# Patient Record
Sex: MALE | Race: WHITE | NOT HISPANIC OR LATINO | Employment: FULL TIME | URBAN - METROPOLITAN AREA
[De-identification: names, ages, dates, MRNs, and addresses within clinical notes are randomized per-mention and may not be internally consistent; named-entity substitution may affect disease eponyms.]

---

## 2020-06-14 ENCOUNTER — HOSPITAL ENCOUNTER (EMERGENCY)
Facility: HOSPITAL | Age: 65
Discharge: HOME | End: 2020-06-14
Attending: EMERGENCY MEDICINE | Admitting: EMERGENCY MEDICINE

## 2020-06-14 ENCOUNTER — APPOINTMENT (OUTPATIENT)
Dept: GENERAL RADIOLOGY | Facility: HOSPITAL | Age: 65
End: 2020-06-14

## 2020-06-14 VITALS
WEIGHT: 237 LBS | HEART RATE: 63 BPM | RESPIRATION RATE: 16 BRPM | DIASTOLIC BLOOD PRESSURE: 81 MMHG | TEMPERATURE: 98.6 F | SYSTOLIC BLOOD PRESSURE: 143 MMHG | OXYGEN SATURATION: 100 % | BODY MASS INDEX: 29.47 KG/M2 | HEIGHT: 75 IN

## 2020-06-14 DIAGNOSIS — R04.2 HEMOPTYSIS: Primary | ICD-10-CM

## 2020-06-14 LAB
ALBUMIN SERPL-MCNC: 4.7 G/DL (ref 3.5–5.2)
ALBUMIN/GLOB SERPL: 1.8 G/DL
ALP SERPL-CCNC: 48 U/L (ref 39–117)
ALT SERPL W P-5'-P-CCNC: 15 U/L (ref 1–41)
ANION GAP SERPL CALCULATED.3IONS-SCNC: 12 MMOL/L (ref 5–15)
AST SERPL-CCNC: 18 U/L (ref 1–40)
BASOPHILS # BLD AUTO: 0.05 10*3/MM3 (ref 0–0.2)
BASOPHILS NFR BLD AUTO: 0.9 % (ref 0–1.5)
BILIRUB SERPL-MCNC: 0.8 MG/DL (ref 0.2–1.2)
BUN BLD-MCNC: 14 MG/DL (ref 8–23)
BUN/CREAT SERPL: 14.7 (ref 7–25)
CALCIUM SPEC-SCNC: 9.7 MG/DL (ref 8.6–10.5)
CHLORIDE SERPL-SCNC: 106 MMOL/L (ref 98–107)
CO2 SERPL-SCNC: 26 MMOL/L (ref 22–29)
CREAT BLD-MCNC: 0.95 MG/DL (ref 0.76–1.27)
D DIMER PPP FEU-MCNC: <0.27 MCGFEU/ML (ref 0–0.56)
DEPRECATED RDW RBC AUTO: 44.3 FL (ref 37–54)
EOSINOPHIL # BLD AUTO: 0.04 10*3/MM3 (ref 0–0.4)
EOSINOPHIL NFR BLD AUTO: 0.7 % (ref 0.3–6.2)
ERYTHROCYTE [DISTWIDTH] IN BLOOD BY AUTOMATED COUNT: 13.3 % (ref 12.3–15.4)
GFR SERPL CREATININE-BSD FRML MDRD: 80 ML/MIN/1.73
GLOBULIN UR ELPH-MCNC: 2.6 GM/DL
GLUCOSE BLD-MCNC: 91 MG/DL (ref 65–99)
HCT VFR BLD AUTO: 44.6 % (ref 37.5–51)
HGB BLD-MCNC: 15 G/DL (ref 13–17.7)
IMM GRANULOCYTES # BLD AUTO: 0.01 10*3/MM3 (ref 0–0.05)
IMM GRANULOCYTES NFR BLD AUTO: 0.2 % (ref 0–0.5)
INR PPP: 1.03 (ref 0.85–1.16)
LYMPHOCYTES # BLD AUTO: 1.47 10*3/MM3 (ref 0.7–3.1)
LYMPHOCYTES NFR BLD AUTO: 27.5 % (ref 19.6–45.3)
MCH RBC QN AUTO: 30.5 PG (ref 26.6–33)
MCHC RBC AUTO-ENTMCNC: 33.6 G/DL (ref 31.5–35.7)
MCV RBC AUTO: 90.7 FL (ref 79–97)
MONOCYTES # BLD AUTO: 0.5 10*3/MM3 (ref 0.1–0.9)
MONOCYTES NFR BLD AUTO: 9.3 % (ref 5–12)
NEUTROPHILS # BLD AUTO: 3.28 10*3/MM3 (ref 1.7–7)
NEUTROPHILS NFR BLD AUTO: 61.4 % (ref 42.7–76)
NRBC BLD AUTO-RTO: 0 /100 WBC (ref 0–0.2)
PLATELET # BLD AUTO: 187 10*3/MM3 (ref 140–450)
PMV BLD AUTO: 9.9 FL (ref 6–12)
POTASSIUM BLD-SCNC: 4.3 MMOL/L (ref 3.5–5.2)
PROT SERPL-MCNC: 7.3 G/DL (ref 6–8.5)
PROTHROMBIN TIME: 13.2 SECONDS (ref 11.5–14)
RBC # BLD AUTO: 4.92 10*6/MM3 (ref 4.14–5.8)
SODIUM BLD-SCNC: 144 MMOL/L (ref 136–145)
WBC NRBC COR # BLD: 5.35 10*3/MM3 (ref 3.4–10.8)

## 2020-06-14 PROCEDURE — 85025 COMPLETE CBC W/AUTO DIFF WBC: CPT | Performed by: PHYSICIAN ASSISTANT

## 2020-06-14 PROCEDURE — 99284 EMERGENCY DEPT VISIT MOD MDM: CPT

## 2020-06-14 PROCEDURE — 85610 PROTHROMBIN TIME: CPT | Performed by: PHYSICIAN ASSISTANT

## 2020-06-14 PROCEDURE — 80053 COMPREHEN METABOLIC PANEL: CPT | Performed by: PHYSICIAN ASSISTANT

## 2020-06-14 PROCEDURE — 71045 X-RAY EXAM CHEST 1 VIEW: CPT

## 2020-06-14 PROCEDURE — 85379 FIBRIN DEGRADATION QUANT: CPT | Performed by: PHYSICIAN ASSISTANT

## 2020-06-14 RX ORDER — SODIUM CHLORIDE 0.9 % (FLUSH) 0.9 %
10 SYRINGE (ML) INJECTION AS NEEDED
Status: DISCONTINUED | OUTPATIENT
Start: 2020-06-14 | End: 2020-06-15 | Stop reason: HOSPADM

## 2020-06-15 NOTE — DISCHARGE INSTRUCTIONS
Follow up with one of the [facility] Primary Care Providers below to setup primary care. If you need assistance coordinating a primary care appointment with a [facility] Primary Care Provider, please contact the Primary Care Coordinators at [phone] for appointment scheduling.    [facility], Primary Care   [address] , [address]   [place], [place] [address] [phone]    [facility] Internal Medicine & Endocrinology  [address], [address]  [place] [address] [phone]    [facility] Family Medicine  [address], [address]   [place] [address] [phone]    [facility] Primary Care  [address], [address]  [place], [place] [address] [phone]    [facility], Primary Care,   [address], [address]   [place], [place] [address] [phone]    [facility] Primary Care  [address]  [place], [place] [address] [phone]    [facility], Primary Care  [address], [address]  [place], [place] [address] [phone]    [facility] Internal Medicine & Pediatrics  [address], [address]   [place] [address] [phone]    [facility], Primary Care  [address] [address] [phone]      [facility] Primary Care  [address], [address]   [place] [address] [phone]    [facility] Family Medicine  [address] [place] [address] [phone]

## 2020-06-15 NOTE — ED PROVIDER NOTES
EMERGENCY DEPARTMENT ENCOUNTER    Room Number:  02/02  Date of encounter:  6/16/2020  PCP: Provider, No Known  Historian: Patient      HPI:  Chief Complaint: Coughing up blood x1 day    A complete HPI/ROS/PMH/PSH/SH/FH are unobtainable due to: N/A    Context: Gigi Cooney is a 65 y.o. male who presents to the ED c/o hemoptysis that happened twice today.  He states he has had no fevers no chills has had a little bit of a cough that was hard last night and did use some cleaning spray which he states he inhaled a little bit of.  He states that he had no COVID exposure that he is aware of.  He has had no chest pain associated with this no dyspnea.  He denies pedal edema.  He has no known cardiac disease, lung disease including COPD or emphysema.  No prior history of DVT or pulmonary emboli.  Risk factors for PE are minimal he does not smoke he is in no prior history of cancer he is no other comorbidities.  He has traveled recently to [place] and back.  He was seen at the [facility] and sent here to be evaluated.      PAST MEDICAL HISTORY  Active Ambulatory Problems     Diagnosis Date Noted   • No Active Ambulatory Problems     Resolved Ambulatory Problems     Diagnosis Date Noted   • No Resolved Ambulatory Problems     No Additional Past Medical History         PAST SURGICAL HISTORY  History reviewed. No pertinent surgical history.      FAMILY HISTORY  History reviewed. No pertinent family history.      SOCIAL HISTORY  Social History     Socioeconomic History   • Marital status: Single     Spouse name: Not on file   • Number of children: Not on file   • Years of education: Not on file   • Highest education level: Not on file   Tobacco Use   • Smoking status: Never Smoker   Substance and Sexual Activity   • Alcohol use: Not Currently         ALLERGIES  Rocephin [ceftriaxone]        REVIEW OF SYSTEMS  Review of Systems     All systems reviewed and negative except for those discussed in HPI.       PHYSICAL  EXAM    I have reviewed the triage vital signs and nursing notes.    ED Triage Vitals   Temp Heart Rate Resp BP SpO2   06/14/20 1705 06/14/20 1708 06/14/20 1708 06/14/20 1708 06/14/20 1708   98.6 °F (37 °C) 60 16 170/95 97 %      Temp src Heart Rate Source Patient Position BP Location FiO2 (%)   -- -- -- -- --              Physical Exam  GENERAL: Warm pink dry afebrile nontoxic well developed.  Appears in no acute distress.  HENT: Nares patent  EYES: No scleral icterus  CV: Regular rhythm, regular rate  RESPIRATORY: Normal effort.  No audible wheezes, rales or rhonchi  ABDOMEN: Soft, nontender  MUSCULOSKELETAL: No deformities.   NEURO: Alert, moves all extremities, follows commands  SKIN: Warm, dry, no rash visualized        LAB RESULTS  No results found for this or any previous visit (from the past 24 hour(s)).    Ordered the above labs and independently reviewed the results.        RADIOLOGY  No Radiology Exams Resulted Within Past 24 Hours    I ordered and reviewed the above noted radiographic studies.    I viewed images of chest x-ray which showed no acute disease per my independent interpretation.  See radiologist's dictation for official interpretation.        PROCEDURES    Procedures      MEDICATIONS GIVEN IN ER    Medications - No data to display      PROGRESS, DATA ANALYSIS, CONSULTS, AND MEDICAL DECISION MAKING    All labs have been independently reviewed by me.  All radiology studies have been reviewed by me and the radiologist dictating the report.   EKG's have been independently viewed and interpreted by me.      Differential diagnoses: Bronchitis, COVID-19, pulmonary emboli           Discussed the findings with the patient.  We will follow-up with his primary care physician.  Discussed possibilities of this being from posterior nasal drainage and his congestion allergies.      AS OF 12:23 VITALS:    BP - 143/81  HR - 63  TEMP - 98.6 °F (37 °C)  O2 SATS - 100%        DIAGNOSIS  Final diagnoses:    Hemoptysis         DISPOSITION  DISCHARGE    Patient discharged in stable condition.    Reviewed implications of results, diagnosis, meds, responsibility to follow up, warning signs and symptoms of possible worsening, potential complications and reasons to return to ER.    Patient/Family voiced understanding of above instructions.    Discussed plan for discharge, as there is no emergent indication for admission.  Pt/family is agreeable and understands need for follow up and possible repeat testing.  Pt/family is aware that discharge does not mean that nothing is wrong but that it indicates no emergency is currently present that requires admission and they must continue care with follow-up as given below or with a physician of their choice.     FOLLOW-UP  [facility] Emergency Department  [address] [address] [phone]    As needed         Medication List      No changes were made to your prescriptions during this visit.                  Alen Parker PA  06/16/20 6007

## 2021-02-02 ENCOUNTER — APPOINTMENT (OUTPATIENT)
Dept: GENERAL RADIOLOGY | Facility: HOSPITAL | Age: 66
End: 2021-02-02

## 2021-02-02 ENCOUNTER — HOSPITAL ENCOUNTER (EMERGENCY)
Facility: HOSPITAL | Age: 66
Discharge: HOME | End: 2021-02-02
Attending: EMERGENCY MEDICINE | Admitting: EMERGENCY MEDICINE

## 2021-02-02 VITALS
HEART RATE: 58 BPM | RESPIRATION RATE: 18 BRPM | OXYGEN SATURATION: 97 % | BODY MASS INDEX: 29.84 KG/M2 | WEIGHT: 240 LBS | HEIGHT: 75 IN | SYSTOLIC BLOOD PRESSURE: 156 MMHG | TEMPERATURE: 98 F | DIASTOLIC BLOOD PRESSURE: 83 MMHG

## 2021-02-02 DIAGNOSIS — M54.2 NECK DISCOMFORT: ICD-10-CM

## 2021-02-02 DIAGNOSIS — R07.89 CHEST TIGHTNESS: Primary | ICD-10-CM

## 2021-02-02 LAB
ALBUMIN SERPL-MCNC: 4.4 G/DL (ref 3.5–5.2)
ALBUMIN/GLOB SERPL: 1.6 G/DL
ALP SERPL-CCNC: 57 U/L (ref 39–117)
ALT SERPL W P-5'-P-CCNC: 11 U/L (ref 1–41)
ANION GAP SERPL CALCULATED.3IONS-SCNC: 12 MMOL/L (ref 5–15)
AST SERPL-CCNC: 11 U/L (ref 1–40)
BASOPHILS # BLD AUTO: 0 10*3/MM3 (ref 0–0.2)
BASOPHILS NFR BLD AUTO: 0 % (ref 0–1.5)
BILIRUB SERPL-MCNC: 0.6 MG/DL (ref 0–1.2)
BUN SERPL-MCNC: 19 MG/DL (ref 8–23)
BUN/CREAT SERPL: 24.4 (ref 7–25)
CALCIUM SPEC-SCNC: 9.3 MG/DL (ref 8.6–10.5)
CHLORIDE SERPL-SCNC: 103 MMOL/L (ref 98–107)
CO2 SERPL-SCNC: 24 MMOL/L (ref 22–29)
CREAT SERPL-MCNC: 0.78 MG/DL (ref 0.76–1.27)
DEPRECATED RDW RBC AUTO: 44.9 FL (ref 37–54)
EOSINOPHIL # BLD AUTO: 0 10*3/MM3 (ref 0–0.4)
EOSINOPHIL NFR BLD AUTO: 0 % (ref 0.3–6.2)
ERYTHROCYTE [DISTWIDTH] IN BLOOD BY AUTOMATED COUNT: 13.6 % (ref 12.3–15.4)
GFR SERPL CREATININE-BSD FRML MDRD: 100 ML/MIN/1.73
GLOBULIN UR ELPH-MCNC: 2.7 GM/DL
GLUCOSE SERPL-MCNC: 120 MG/DL (ref 65–99)
HCT VFR BLD AUTO: 44.2 % (ref 37.5–51)
HGB BLD-MCNC: 15.2 G/DL (ref 13–17.7)
HOLD SPECIMEN: NORMAL
IMM GRANULOCYTES # BLD AUTO: 0.04 10*3/MM3 (ref 0–0.05)
IMM GRANULOCYTES NFR BLD AUTO: 0.4 % (ref 0–0.5)
LIPASE SERPL-CCNC: 21 U/L (ref 13–60)
LYMPHOCYTES # BLD AUTO: 1.2 10*3/MM3 (ref 0.7–3.1)
LYMPHOCYTES NFR BLD AUTO: 13.5 % (ref 19.6–45.3)
MCH RBC QN AUTO: 30.9 PG (ref 26.6–33)
MCHC RBC AUTO-ENTMCNC: 34.4 G/DL (ref 31.5–35.7)
MCV RBC AUTO: 89.8 FL (ref 79–97)
MONOCYTES # BLD AUTO: 0.36 10*3/MM3 (ref 0.1–0.9)
MONOCYTES NFR BLD AUTO: 4 % (ref 5–12)
NEUTROPHILS NFR BLD AUTO: 7.3 10*3/MM3 (ref 1.7–7)
NEUTROPHILS NFR BLD AUTO: 82.1 % (ref 42.7–76)
NRBC BLD AUTO-RTO: 0 /100 WBC (ref 0–0.2)
NT-PROBNP SERPL-MCNC: 93.7 PG/ML (ref 0–900)
PLATELET # BLD AUTO: 218 10*3/MM3 (ref 140–450)
PMV BLD AUTO: 9.6 FL (ref 6–12)
POTASSIUM SERPL-SCNC: 4 MMOL/L (ref 3.5–5.2)
PROT SERPL-MCNC: 7.1 G/DL (ref 6–8.5)
QT INTERVAL: 398 MS
QTC INTERVAL: 423 MS
RBC # BLD AUTO: 4.92 10*6/MM3 (ref 4.14–5.8)
SODIUM SERPL-SCNC: 139 MMOL/L (ref 136–145)
TROPONIN T SERPL-MCNC: <0.01 NG/ML (ref 0–0.03)
TROPONIN T SERPL-MCNC: <0.01 NG/ML (ref 0–0.03)
WBC # BLD AUTO: 8.9 10*3/MM3 (ref 3.4–10.8)
WHOLE BLOOD HOLD SPECIMEN: NORMAL
WHOLE BLOOD HOLD SPECIMEN: NORMAL

## 2021-02-02 PROCEDURE — 99284 EMERGENCY DEPT VISIT MOD MDM: CPT

## 2021-02-02 PROCEDURE — 93005 ELECTROCARDIOGRAM TRACING: CPT | Performed by: EMERGENCY MEDICINE

## 2021-02-02 PROCEDURE — 71045 X-RAY EXAM CHEST 1 VIEW: CPT

## 2021-02-02 PROCEDURE — U0004 COV-19 TEST NON-CDC HGH THRU: HCPCS | Performed by: EMERGENCY MEDICINE

## 2021-02-02 PROCEDURE — 85025 COMPLETE CBC W/AUTO DIFF WBC: CPT | Performed by: EMERGENCY MEDICINE

## 2021-02-02 PROCEDURE — 83880 ASSAY OF NATRIURETIC PEPTIDE: CPT | Performed by: EMERGENCY MEDICINE

## 2021-02-02 PROCEDURE — C9803 HOPD COVID-19 SPEC COLLECT: HCPCS | Performed by: EMERGENCY MEDICINE

## 2021-02-02 PROCEDURE — 83690 ASSAY OF LIPASE: CPT | Performed by: EMERGENCY MEDICINE

## 2021-02-02 PROCEDURE — 84484 ASSAY OF TROPONIN QUANT: CPT | Performed by: EMERGENCY MEDICINE

## 2021-02-02 PROCEDURE — 80053 COMPREHEN METABOLIC PANEL: CPT | Performed by: EMERGENCY MEDICINE

## 2021-02-02 RX ORDER — SODIUM CHLORIDE 0.9 % (FLUSH) 0.9 %
10 SYRINGE (ML) INJECTION AS NEEDED
Status: DISCONTINUED | OUTPATIENT
Start: 2021-02-02 | End: 2021-02-02 | Stop reason: HOSPADM

## 2021-02-02 RX ORDER — ASPIRIN 81 MG/1
324 TABLET, CHEWABLE ORAL ONCE
Status: COMPLETED | OUTPATIENT
Start: 2021-02-02 | End: 2021-02-02

## 2021-02-02 RX ADMIN — ASPIRIN 324 MG: 81 TABLET, CHEWABLE ORAL at 14:04

## 2021-02-02 NOTE — ED PROVIDER NOTES
EMERGENCY DEPARTMENT ENCOUNTER    Room Number:  31/31  Date of encounter:  2/2/2021  PCP: Provider, No Known  Historian: Patient      HPI:  Chief Complaint: Tightness in neck, chest, abdomen.        Context: Gigi Cooney is a 65 y.o. male who presents to the ED c/o what he feels to be an allergic reaction.  The patient received a course of Zithromax starting on January 21 or 22 and, bleeding on the 24th when he felt symptoms as above.  He presented to [facility] emergency department on 125 and was given Benadryl, Pepcid, Solu-Medrol on the 26.  He took a Medrol Dosepak and seemed to improve.  Patient felt his symptoms returning and went back to [facility] emergency department yesterday.  Again he was given Solu-Medrol and Pepcid.  He had no rash but had the sensation of constriction in his neck chest and upper abdomen.  They worked him up with normal chest x-ray troponin and other labs.  He is now on another Medrol Dosepak.  He talked with a friend who suggested he come to our facility for further evaluation as he may still be suffering from allergic reaction.      PAST MEDICAL HISTORY  Active Ambulatory Problems     Diagnosis Date Noted   • No Active Ambulatory Problems     Resolved Ambulatory Problems     Diagnosis Date Noted   • No Resolved Ambulatory Problems     Past Medical History:   Diagnosis Date   • Allergies          PAST SURGICAL HISTORY  Past Surgical History:   Procedure Laterality Date   • TONSILLECTOMY           FAMILY HISTORY  History reviewed. No pertinent family history.      SOCIAL HISTORY  Social History     Socioeconomic History   • Marital status: Single     Spouse name: Not on file   • Number of children: Not on file   • Years of education: Not on file   • Highest education level: Not on file   Tobacco Use   • Smoking status: Never Smoker   Substance and Sexual Activity   • Alcohol use: Not Currently         ALLERGIES  Rocephin [ceftriaxone] and Azithromycin        REVIEW OF  SYSTEMS  Review of Systems     All systems reviewed and negative except for those discussed in HPI.       PHYSICAL EXAM    I have reviewed the triage vital signs and nursing notes.    ED Triage Vitals [02/02/21 1305]   Temp Heart Rate Resp BP SpO2   98 °F (36.7 °C) 75 16 173/79 99 %      Temp src Heart Rate Source Patient Position BP Location FiO2 (%)   -- Monitor Sitting Left arm --       Physical Exam  GENERAL:   Appears somewhat anxious but nontoxic.  HENT: Nares patent.  Clear oropharynx without any signs of edema  EYES: No scleral icterus.  CV: Regular rhythm, regular rate.  No murmurs gallops rubs  RESPIRATORY: Normal effort.  No audible wheezes, rales or rhonchi.  Completely clear to auscultation even with forced expiration there are no wheezes.  ABDOMEN: Soft, nontender.  MUSCULOSKELETAL: No deformities.   NEURO: Alert, moves all extremities, follows commands.  SKIN: Warm, dry, no rash visualized.        LAB RESULTS  Recent Results (from the past 24 hour(s))   ECG 12 Lead    Collection Time: 02/02/21  1:42 PM   Result Value Ref Range    QT Interval 398 ms    QTC Interval 423 ms   Troponin    Collection Time: 02/02/21  1:57 PM    Specimen: Blood   Result Value Ref Range    Troponin T <0.010 0.000 - 0.030 ng/mL   Comprehensive Metabolic Panel    Collection Time: 02/02/21  1:57 PM    Specimen: Blood   Result Value Ref Range    Glucose 120 (H) 65 - 99 mg/dL    BUN 19 8 - 23 mg/dL    Creatinine 0.78 0.76 - 1.27 mg/dL    Sodium 139 136 - 145 mmol/L    Potassium 4.0 3.5 - 5.2 mmol/L    Chloride 103 98 - 107 mmol/L    CO2 24.0 22.0 - 29.0 mmol/L    Calcium 9.3 8.6 - 10.5 mg/dL    Total Protein 7.1 6.0 - 8.5 g/dL    Albumin 4.40 3.50 - 5.20 g/dL    ALT (SGPT) 11 1 - 41 U/L    AST (SGOT) 11 1 - 40 U/L    Alkaline Phosphatase 57 39 - 117 U/L    Total Bilirubin 0.6 0.0 - 1.2 mg/dL    eGFR Non African Amer 100 >60 mL/min/1.73    Globulin 2.7 gm/dL    A/G Ratio 1.6 g/dL    BUN/Creatinine Ratio 24.4 7.0 - 25.0    Anion Gap  12.0 5.0 - 15.0 mmol/L   Lipase    Collection Time: 02/02/21  1:57 PM    Specimen: Blood   Result Value Ref Range    Lipase 21 13 - 60 U/L   BNP    Collection Time: 02/02/21  1:57 PM    Specimen: Blood   Result Value Ref Range    proBNP 93.7 0.0 - 900.0 pg/mL   Light Blue Top    Collection Time: 02/02/21  1:57 PM   Result Value Ref Range    Extra Tube hold for add-on    Green Top (Gel)    Collection Time: 02/02/21  1:57 PM   Result Value Ref Range    Extra Tube Hold for add-ons.    Lavender Top    Collection Time: 02/02/21  1:57 PM   Result Value Ref Range    Extra Tube hold for add-on    Gold Top - SST    Collection Time: 02/02/21  1:57 PM   Result Value Ref Range    Extra Tube Hold for add-ons.    Gray Top - Ice    Collection Time: 02/02/21  1:57 PM   Result Value Ref Range    Extra Tube Hold for add-ons.    CBC Auto Differential    Collection Time: 02/02/21  1:57 PM    Specimen: Blood   Result Value Ref Range    WBC 8.90 3.40 - 10.80 10*3/mm3    RBC 4.92 4.14 - 5.80 10*6/mm3    Hemoglobin 15.2 13.0 - 17.7 g/dL    Hematocrit 44.2 37.5 - 51.0 %    MCV 89.8 79.0 - 97.0 fL    MCH 30.9 26.6 - 33.0 pg    MCHC 34.4 31.5 - 35.7 g/dL    RDW 13.6 12.3 - 15.4 %    RDW-SD 44.9 37.0 - 54.0 fl    MPV 9.6 6.0 - 12.0 fL    Platelets 218 140 - 450 10*3/mm3    Neutrophil % 82.1 (H) 42.7 - 76.0 %    Lymphocyte % 13.5 (L) 19.6 - 45.3 %    Monocyte % 4.0 (L) 5.0 - 12.0 %    Eosinophil % 0.0 (L) 0.3 - 6.2 %    Basophil % 0.0 0.0 - 1.5 %    Immature Grans % 0.4 0.0 - 0.5 %    Neutrophils, Absolute 7.30 (H) 1.70 - 7.00 10*3/mm3    Lymphocytes, Absolute 1.20 0.70 - 3.10 10*3/mm3    Monocytes, Absolute 0.36 0.10 - 0.90 10*3/mm3    Eosinophils, Absolute 0.00 0.00 - 0.40 10*3/mm3    Basophils, Absolute 0.00 0.00 - 0.20 10*3/mm3    Immature Grans, Absolute 0.04 0.00 - 0.05 10*3/mm3    nRBC 0.0 0.0 - 0.2 /100 WBC   Troponin    Collection Time: 02/02/21  3:54 PM    Specimen: Blood   Result Value Ref Range    Troponin T <0.010 0.000 - 0.030  ng/mL       If labs were ordered, I independently reviewed the results.        RADIOLOGY  Xr Chest 1 View    Result Date: 2/2/2021  EXAMINATION: XR CHEST 1 VW- 02/02/2021  INDICATION: Chest Pain triage protocol  COMPARISON: 06/14/2020  FINDINGS: Heart, mediastinum and pulmonary vasculature appear within normal limits. Lungs appear well-expanded and clear except for mild peribronchial thickening and patchy interstitial changes, a little greater than on the prior study, perhaps low-level changes of bronchitis. No lung consolidation, effusion or pneumothorax is seen.      Mildly increased pulmonary interstitial markings bilaterally, perhaps mild bronchitis. No focal pneumonia or other new chest disease is seen.   D:  02/02/2021 E:  02/02/2021        I ordered and reviewed the above noted radiographic studies.    I viewed images of chest x-ray which showed no infiltrates per my independent interpretation.  See radiologist's dictation for official interpretation.        PROCEDURES    Procedures    ECG 12 Lead         ECG 12 Lead   Final Result   Test Reason : chest pain   Blood Pressure : **/** mmHG   Vent. Rate : 068 BPM     Atrial Rate : 068 BPM      P-R Int : 186 ms          QRS Dur : 106 ms       QT Int : 398 ms       P-R-T Axes : 061 062 030 degrees      QTc Int : 423 ms      Normal sinus rhythm with sinus arrhythmia   Normal ECG   No previous ECGs available   Confirmed by SARAH LOONEY MD ([id number]) on 2/2/2021 7:09:23 PM      Referred By:  EDMD           Confirmed By:SARAH LOONEY MD          MEDICATIONS GIVEN IN ER    Medications   sodium chloride 0.9 % flush 10 mL (has no administration in time range)   aspirin chewable tablet 324 mg (324 mg Oral Given 2/2/21 1404)         PROGRESS, DATA ANALYSIS, CONSULTS, AND MEDICAL DECISION MAKING    All labs have been independently reviewed by me.  All radiology studies have been reviewed by me and the radiologist dictating the report.   EKG's have been independently viewed and  interpreted by me.          ED Course as of Feb 02 1916   Tue Feb 02, 2021 1910 HEART Pathway for Early Discharge in Acute Chest Pain - MDCalc  Calculated on Feb 02 2021 7:09 PM  3 points -> HEART Pathway Score  Low risk -> 0.9-1.7% 30-day MACE Repeat troponin at 3 hours and if negative, discharge home with outpatient follow-up.    [MS]   1910 I spoke with the patient in detail about discharge instructions.  He understands the need to return immediately if worse.  On recheck after at least 5 hours of being in the emergency department he still has no oropharyngeal edema, wheezes, dermal changes.  He does admit that stress could be causing some of his symptoms.  I will still send him to the heart valve clinic for further cardiac testing.  I will have him continue his Medrol Dosepak.    [MS]      ED Course User Index  [MS] Clay Mccarty MD             AS OF 19:16 EST VITALS:    BP - 151/78  HR - 63  TEMP - 98 °F (36.7 °C)  O2 SATS - 98%        DIAGNOSIS  Final diagnoses:   Chest tightness   Neck discomfort         DISPOSITION  DISCHARGE    FOLLOW-UP  [facility] Emergency Department  [address] [address] [phone]    IF YOU HAVE ANY CONCERN OF WORSENING CONDITION         Medication List      No changes were made to your prescriptions during this visit.                  Clay Mccarty MD  02/02/21 1916

## 2021-02-03 LAB — SARS-COV-2 RNA RESP QL NAA+PROBE: NOT DETECTED

## 2021-02-03 NOTE — DISCHARGE INSTRUCTIONS
Continue your medications as previously prescribed.    Follow-up with your primary care provider next available.    Follow-up with a heart and valve clinic as I have ordered.  They should be calling you tomorrow to set up an outpatient appointment.    Return to the emergency department if you have any concerns of worsening condition.

## 2021-02-04 LAB
QT INTERVAL: 424 MS
QTC INTERVAL: 430 MS

## 2021-02-07 ENCOUNTER — HOSPITAL ENCOUNTER (OUTPATIENT)
Facility: HOSPITAL | Age: 66
Setting detail: OBSERVATION
Discharge: HOME | End: 2021-02-08
Attending: EMERGENCY MEDICINE | Admitting: HOSPITALIST

## 2021-02-07 ENCOUNTER — APPOINTMENT (OUTPATIENT)
Dept: CT IMAGING | Facility: HOSPITAL | Age: 66
End: 2021-02-07

## 2021-02-07 DIAGNOSIS — R07.9 CHEST PAIN, UNSPECIFIED TYPE: Primary | ICD-10-CM

## 2021-02-07 DIAGNOSIS — R13.14 PHARYNGOESOPHAGEAL DYSPHAGIA: ICD-10-CM

## 2021-02-07 PROBLEM — R93.3 ABNORMAL COMPUTED TOMOGRAPHY OF ESOPHAGUS: Status: ACTIVE | Noted: 2021-02-07

## 2021-02-07 PROBLEM — R03.0 ELEVATED BLOOD-PRESSURE READING WITHOUT DIAGNOSIS OF HYPERTENSION: Status: ACTIVE | Noted: 2021-02-07

## 2021-02-07 LAB
ALBUMIN SERPL-MCNC: 4.8 G/DL (ref 3.5–5.2)
ALBUMIN/GLOB SERPL: 1.7 G/DL
ALP SERPL-CCNC: 65 U/L (ref 39–117)
ALT SERPL W P-5'-P-CCNC: 12 U/L (ref 1–41)
ANION GAP SERPL CALCULATED.3IONS-SCNC: 12 MMOL/L (ref 5–15)
AST SERPL-CCNC: 13 U/L (ref 1–40)
BASOPHILS # BLD AUTO: 0.05 10*3/MM3 (ref 0–0.2)
BASOPHILS NFR BLD AUTO: 0.4 % (ref 0–1.5)
BILIRUB SERPL-MCNC: 0.9 MG/DL (ref 0–1.2)
BUN SERPL-MCNC: 24 MG/DL (ref 8–23)
BUN/CREAT SERPL: 21.6 (ref 7–25)
CALCIUM SPEC-SCNC: 10.1 MG/DL (ref 8.6–10.5)
CHLORIDE SERPL-SCNC: 98 MMOL/L (ref 98–107)
CO2 SERPL-SCNC: 27 MMOL/L (ref 22–29)
CREAT BLDA-MCNC: 1.1 MG/DL (ref 0.6–1.3)
CREAT SERPL-MCNC: 1.11 MG/DL (ref 0.76–1.27)
DEPRECATED RDW RBC AUTO: 45.9 FL (ref 37–54)
EOSINOPHIL # BLD AUTO: 0.07 10*3/MM3 (ref 0–0.4)
EOSINOPHIL NFR BLD AUTO: 0.6 % (ref 0.3–6.2)
ERYTHROCYTE [DISTWIDTH] IN BLOOD BY AUTOMATED COUNT: 13.8 % (ref 12.3–15.4)
FLUAV RNA RESP QL NAA+PROBE: NOT DETECTED
FLUBV RNA RESP QL NAA+PROBE: NOT DETECTED
GFR SERPL CREATININE-BSD FRML MDRD: 66 ML/MIN/1.73
GLOBULIN UR ELPH-MCNC: 2.9 GM/DL
GLUCOSE SERPL-MCNC: 98 MG/DL (ref 65–99)
HCT VFR BLD AUTO: 47.7 % (ref 37.5–51)
HGB BLD-MCNC: 16.2 G/DL (ref 13–17.7)
HOLD SPECIMEN: NORMAL
IMM GRANULOCYTES # BLD AUTO: 0.04 10*3/MM3 (ref 0–0.05)
IMM GRANULOCYTES NFR BLD AUTO: 0.3 % (ref 0–0.5)
LIPASE SERPL-CCNC: 32 U/L (ref 13–60)
LYMPHOCYTES # BLD AUTO: 2.54 10*3/MM3 (ref 0.7–3.1)
LYMPHOCYTES NFR BLD AUTO: 20.3 % (ref 19.6–45.3)
MCH RBC QN AUTO: 30.6 PG (ref 26.6–33)
MCHC RBC AUTO-ENTMCNC: 34 G/DL (ref 31.5–35.7)
MCV RBC AUTO: 90.2 FL (ref 79–97)
MONOCYTES # BLD AUTO: 0.96 10*3/MM3 (ref 0.1–0.9)
MONOCYTES NFR BLD AUTO: 7.7 % (ref 5–12)
NEUTROPHILS NFR BLD AUTO: 70.7 % (ref 42.7–76)
NEUTROPHILS NFR BLD AUTO: 8.85 10*3/MM3 (ref 1.7–7)
NRBC BLD AUTO-RTO: 0 /100 WBC (ref 0–0.2)
NT-PROBNP SERPL-MCNC: 42.4 PG/ML (ref 0–900)
PLATELET # BLD AUTO: 232 10*3/MM3 (ref 140–450)
PMV BLD AUTO: 9.5 FL (ref 6–12)
POTASSIUM SERPL-SCNC: 4.2 MMOL/L (ref 3.5–5.2)
PROT SERPL-MCNC: 7.7 G/DL (ref 6–8.5)
QT INTERVAL: 402 MS
QT INTERVAL: 428 MS
QTC INTERVAL: 394 MS
QTC INTERVAL: 409 MS
RBC # BLD AUTO: 5.29 10*6/MM3 (ref 4.14–5.8)
SARS-COV-2 RNA RESP QL NAA+PROBE: NOT DETECTED
SODIUM SERPL-SCNC: 137 MMOL/L (ref 136–145)
TROPONIN T SERPL-MCNC: <0.01 NG/ML (ref 0–0.03)
WBC # BLD AUTO: 12.51 10*3/MM3 (ref 3.4–10.8)
WHOLE BLOOD HOLD SPECIMEN: NORMAL
WHOLE BLOOD HOLD SPECIMEN: NORMAL

## 2021-02-07 PROCEDURE — 96372 THER/PROPH/DIAG INJ SC/IM: CPT

## 2021-02-07 PROCEDURE — 83690 ASSAY OF LIPASE: CPT | Performed by: EMERGENCY MEDICINE

## 2021-02-07 PROCEDURE — 84484 ASSAY OF TROPONIN QUANT: CPT | Performed by: EMERGENCY MEDICINE

## 2021-02-07 PROCEDURE — 99204 OFFICE O/P NEW MOD 45 MIN: CPT | Performed by: INTERNAL MEDICINE

## 2021-02-07 PROCEDURE — 99220 PR INITIAL OBSERVATION CARE/DAY 70 MINUTES: CPT | Performed by: PHYSICIAN ASSISTANT

## 2021-02-07 PROCEDURE — 83880 ASSAY OF NATRIURETIC PEPTIDE: CPT | Performed by: EMERGENCY MEDICINE

## 2021-02-07 PROCEDURE — G0378 HOSPITAL OBSERVATION PER HR: HCPCS

## 2021-02-07 PROCEDURE — 87636 SARSCOV2 & INF A&B AMP PRB: CPT | Performed by: INTERNAL MEDICINE

## 2021-02-07 PROCEDURE — 92610 EVALUATE SWALLOWING FUNCTION: CPT

## 2021-02-07 PROCEDURE — C9803 HOPD COVID-19 SPEC COLLECT: HCPCS

## 2021-02-07 PROCEDURE — 82565 ASSAY OF CREATININE: CPT

## 2021-02-07 PROCEDURE — 99284 EMERGENCY DEPT VISIT MOD MDM: CPT

## 2021-02-07 PROCEDURE — 25010000002 ENOXAPARIN PER 10 MG: Performed by: PHYSICIAN ASSISTANT

## 2021-02-07 PROCEDURE — 80053 COMPREHEN METABOLIC PANEL: CPT | Performed by: EMERGENCY MEDICINE

## 2021-02-07 PROCEDURE — 0 IOPAMIDOL PER 1 ML: Performed by: EMERGENCY MEDICINE

## 2021-02-07 PROCEDURE — 71275 CT ANGIOGRAPHY CHEST: CPT

## 2021-02-07 PROCEDURE — 85025 COMPLETE CBC W/AUTO DIFF WBC: CPT | Performed by: EMERGENCY MEDICINE

## 2021-02-07 PROCEDURE — 74174 CTA ABD&PLVS W/CONTRAST: CPT

## 2021-02-07 PROCEDURE — 93005 ELECTROCARDIOGRAM TRACING: CPT | Performed by: EMERGENCY MEDICINE

## 2021-02-07 PROCEDURE — 84484 ASSAY OF TROPONIN QUANT: CPT | Performed by: HOSPITALIST

## 2021-02-07 RX ORDER — ONDANSETRON 2 MG/ML
4 INJECTION INTRAMUSCULAR; INTRAVENOUS EVERY 6 HOURS PRN
Status: DISCONTINUED | OUTPATIENT
Start: 2021-02-07 | End: 2021-02-08 | Stop reason: HOSPADM

## 2021-02-07 RX ORDER — ACETAMINOPHEN 650 MG/1
650 SUPPOSITORY RECTAL EVERY 4 HOURS PRN
Status: DISCONTINUED | OUTPATIENT
Start: 2021-02-07 | End: 2021-02-08 | Stop reason: HOSPADM

## 2021-02-07 RX ORDER — ACETAMINOPHEN 325 MG/1
650 TABLET ORAL EVERY 4 HOURS PRN
Status: DISCONTINUED | OUTPATIENT
Start: 2021-02-07 | End: 2021-02-08 | Stop reason: HOSPADM

## 2021-02-07 RX ORDER — DIPHENHYDRAMINE HCL 25 MG
25 CAPSULE ORAL 3 TIMES DAILY
COMMUNITY

## 2021-02-07 RX ORDER — ACETAMINOPHEN 160 MG/5ML
650 SOLUTION ORAL EVERY 4 HOURS PRN
Status: DISCONTINUED | OUTPATIENT
Start: 2021-02-07 | End: 2021-02-08 | Stop reason: HOSPADM

## 2021-02-07 RX ORDER — FAMOTIDINE 20 MG/1
20 TABLET, FILM COATED ORAL DAILY
COMMUNITY
End: 2021-02-08 | Stop reason: HOSPADM

## 2021-02-07 RX ORDER — SODIUM CHLORIDE 0.9 % (FLUSH) 0.9 %
10 SYRINGE (ML) INJECTION AS NEEDED
Status: DISCONTINUED | OUTPATIENT
Start: 2021-02-07 | End: 2021-02-08 | Stop reason: HOSPADM

## 2021-02-07 RX ORDER — ASPIRIN 81 MG/1
324 TABLET, CHEWABLE ORAL ONCE
Status: COMPLETED | OUTPATIENT
Start: 2021-02-07 | End: 2021-02-07

## 2021-02-07 RX ORDER — SODIUM CHLORIDE 0.9 % (FLUSH) 0.9 %
10 SYRINGE (ML) INJECTION EVERY 12 HOURS SCHEDULED
Status: DISCONTINUED | OUTPATIENT
Start: 2021-02-07 | End: 2021-02-08 | Stop reason: HOSPADM

## 2021-02-07 RX ORDER — PANTOPRAZOLE SODIUM 40 MG/1
40 TABLET, DELAYED RELEASE ORAL
Status: DISCONTINUED | OUTPATIENT
Start: 2021-02-07 | End: 2021-02-08 | Stop reason: HOSPADM

## 2021-02-07 RX ORDER — CHOLECALCIFEROL (VITAMIN D3) 125 MCG
5 CAPSULE ORAL NIGHTLY PRN
Status: DISCONTINUED | OUTPATIENT
Start: 2021-02-07 | End: 2021-02-08 | Stop reason: HOSPADM

## 2021-02-07 RX ORDER — METHYLPREDNISOLONE 4 MG/1
1 TABLET ORAL ONCE
COMMUNITY
End: 2021-02-08 | Stop reason: HOSPADM

## 2021-02-07 RX ADMIN — PANTOPRAZOLE SODIUM 40 MG: 40 TABLET, DELAYED RELEASE ORAL at 08:03

## 2021-02-07 RX ADMIN — SODIUM CHLORIDE, PRESERVATIVE FREE 10 ML: 5 INJECTION INTRAVENOUS at 08:03

## 2021-02-07 RX ADMIN — ENOXAPARIN SODIUM 40 MG: 40 INJECTION SUBCUTANEOUS at 06:34

## 2021-02-07 RX ADMIN — ASPIRIN 324 MG: 81 TABLET, CHEWABLE ORAL at 02:42

## 2021-02-07 RX ADMIN — SODIUM CHLORIDE, PRESERVATIVE FREE 10 ML: 5 INJECTION INTRAVENOUS at 20:27

## 2021-02-07 RX ADMIN — IOPAMIDOL 100 ML: 755 INJECTION, SOLUTION INTRAVENOUS at 03:26

## 2021-02-07 NOTE — PLAN OF CARE
"Goal Outcome Evaluation:      VSS, sinus fredy to nsr on monitor.  Denies chest pain but reports chest tightness which begins in abdomen and radiates up through chest like a \"squeeze\" and then releases, intermittent.  Denies SOA on room air.  New cardiology consult to see in AM and echo planned for AM.     "

## 2021-02-07 NOTE — PLAN OF CARE
Problem: Adult Inpatient Plan of Care  Goal: Plan of Care Review  Outcome: Ongoing, Progressing  Flowsheets  Taken 2/7/2021 1610 by Rios Ramsey, RN  Progress: no change  Outcome Summary: VSS. No c/o chest pain. Swallow test today. Speech planning for MBS and limited upper GI tomorrow. Wiill contniue to monitor.  Taken 2/7/2021 0919 by Jason Bianchi MS CCC-SLP  Plan of Care Reviewed With: patient  Goal: Patient-Specific Goal (Individualized)  Outcome: Ongoing, Progressing  Goal: Absence of Hospital-Acquired Illness or Injury  Outcome: Ongoing, Progressing  Intervention: Identify and Manage Fall Risk  Recent Flowsheet Documentation  Taken 2/7/2021 1600 by Rios Ramsey, RN  Safety Promotion/Fall Prevention:   activity supervised   assistive device/personal items within reach   clutter free environment maintained   fall prevention program maintained   nonskid shoes/slippers when out of bed   room organization consistent   safety round/check completed   toileting scheduled  Taken 2/7/2021 1400 by Rios Ramsey, RN  Safety Promotion/Fall Prevention:   activity supervised   assistive device/personal items within reach   clutter free environment maintained   fall prevention program maintained   nonskid shoes/slippers when out of bed   room organization consistent   safety round/check completed   toileting scheduled  Taken 2/7/2021 1200 by Rios Ramsey, RN  Safety Promotion/Fall Prevention:   activity supervised   assistive device/personal items within reach   clutter free environment maintained   fall prevention program maintained   nonskid shoes/slippers when out of bed   room organization consistent   safety round/check completed   toileting scheduled  Taken 2/7/2021 1000 by Rios Ramsey, RN  Safety Promotion/Fall Prevention:   activity supervised   assistive device/personal items within reach   clutter free environment maintained   fall prevention program maintained   nonskid shoes/slippers when out of bed   room  organization consistent   safety round/check completed   toileting scheduled  Taken 2/7/2021 0800 by Rios Ramsey RN  Safety Promotion/Fall Prevention:   activity supervised   assistive device/personal items within reach   clutter free environment maintained   fall prevention program maintained   nonskid shoes/slippers when out of bed   room organization consistent   safety round/check completed   toileting scheduled  Intervention: Prevent Skin Injury  Recent Flowsheet Documentation  Taken 2/7/2021 1600 by Rios Ramsey RN  Body Position: position changed independently  Taken 2/7/2021 1400 by Rios Ramsey RN  Body Position: position changed independently  Taken 2/7/2021 1200 by Rios Ramsey RN  Body Position: position changed independently  Taken 2/7/2021 1000 by Rios Ramsey RN  Body Position: position changed independently  Taken 2/7/2021 0800 by Rios Ramsey RN  Body Position: position changed independently  Intervention: Prevent and Manage VTE (venous thromboembolism) Risk  Recent Flowsheet Documentation  Taken 2/7/2021 0800 by Rios Ramsey RN  VTE Prevention/Management:   bilateral   dorsiflexion/plantar flexion performed  Intervention: Prevent Infection  Recent Flowsheet Documentation  Taken 2/7/2021 1600 by Rios Ramsey RN  Infection Prevention:   visitors restricted/screened   single patient room provided   rest/sleep promoted   personal protective equipment utilized   hand hygiene promoted  Taken 2/7/2021 1400 by Rios Ramsey RN  Infection Prevention:   visitors restricted/screened   single patient room provided   rest/sleep promoted   personal protective equipment utilized   hand hygiene promoted  Taken 2/7/2021 1200 by Rios Ramsey RN  Infection Prevention:   visitors restricted/screened   single patient room provided   rest/sleep promoted   personal protective equipment utilized   hand hygiene promoted  Taken 2/7/2021 1000 by Rios Ramsey RN  Infection Prevention:   visitors  restricted/screened   single patient room provided   rest/sleep promoted   personal protective equipment utilized   hand hygiene promoted  Taken 2/7/2021 0800 by Rios Ramsey, RN  Infection Prevention:   visitors restricted/screened   single patient room provided   rest/sleep promoted   personal protective equipment utilized   hand hygiene promoted  Goal: Optimal Comfort and Wellbeing  Outcome: Ongoing, Progressing  Intervention: Provide Person-Centered Care  Recent Flowsheet Documentation  Taken 2/7/2021 0800 by Rios Ramsey, RN  Trust Relationship/Rapport:   care explained   choices provided  Goal: Readiness for Transition of Care  Outcome: Ongoing, Progressing   Goal Outcome Evaluation:     Progress: no change  Outcome Summary: VSS. No c/o chest pain. Swallow test today. Speech planning for MBS and limited upper GI tomorrow. Wiill contniue to monitor.

## 2021-02-07 NOTE — H&P
[facility] Medicine Services  HISTORY AND PHYSICAL    Patient Name: Gigi Cooney  : 1955  MRN: [id number]  Primary Care Physician: Provider, No Known  Date of admission: 2021    Subjective   Subjective     Chief Complaint:  Chest pain     HPI:  Gigi Cooney is a 65 y.o. male with no significant past medical history who presents to the ED from home for persistent chest tightness and fluttering. This is the patient's fourth ER visit for similar symptoms. Pt initially had an orthodontic procedure and afterwards, had pain and swelling in his face. He was prescribed azithromycin, and he took 4 doses beginning  or . He felt the sensation of difficulty swallowing and a tightness in his chest, so he went to the ED at [facility], where he was treated with benadryl, pepcid and solumedrol on .  Pt went back to the ED on  for recurrent symptoms and he was given additional solumedrol and pepcid, and sent home with a medrol dosepack.  He again had recurrent symptoms on , and came to the [facility] ED.  He had a negative troponin, EKG and CXR. He was given asa 324 mg PO. His BP was noted to be elevated at that time, with a /79.      Pt returns to the ED again today for persistent chest tightness and fluttering. Pt states that after he takes a dose of steroid, he feels better, but then as time goes on, he has increased throat tightness and chest tightness.  He had a stress test approximately 10 years ago but it was negative. He had a recent negative COVID test.     Today, patient noted to be hypertensive with initial /96.  Labs signfiicant for Wbc 12.51, negative troponin, EKG SB 55 bpm, 58 bpm.  CTA chest significant for fluid filled patulous esophagus, possibly secondary to reflux disease.  Otherwise negative scan.      Hospital medicine will admit for observation.         Current COVID Risks are:  [] Fever []  Cough [x] Shortness of  breath [] Fatigue [] Change in taste or smell    [] Exposure to COVID positive patient  [] High risk facility   []  NONE    Review of Systems   Constitutional: Negative.    HENT: Positive for trouble swallowing.    Eyes: Negative.    Respiratory: Positive for chest tightness and shortness of breath.    Cardiovascular: Positive for chest pain.   Gastrointestinal: Negative.    Endocrine: Negative.    Genitourinary: Negative.    Musculoskeletal: Negative.    Skin: Negative.    Allergic/Immunologic: Negative.    Neurological: Positive for light-headedness.   Hematological: Negative.    Psychiatric/Behavioral: The patient is nervous/anxious.         All other systems reviewed and are negative.     Personal History     Past Medical History:   Diagnosis Date   • Allergies        Past Surgical History:   Procedure Laterality Date   • TONSILLECTOMY         Family History: family history is not on file. denies family history of cardiac disease or stroke.    Social History:  reports that he has never smoked. He does not have any smokeless tobacco history on file. He reports previous alcohol use.  Social History     Social History Narrative   • Not on file       Medications:       Allergies   Allergen Reactions   • Rocephin [Ceftriaxone] Anaphylaxis   • Azithromycin Other (See Comments)     other       Objective   Objective     Vital Signs:   Temp:  [97.3 °F (36.3 °C)] 97.3 °F (36.3 °C)  Heart Rate:  [52-65] 52  Resp:  [18] 18  BP: (141-178)/(78-96) 141/78    Physical Exam   Constitutional: Awake, alert  Eyes: PERRLA, sclerae anicteric, no conjunctival injection, glasses  HENT: NCAT, mucous membranes moist  Neck: Supple, no thyromegaly, no lymphadenopathy, trachea midline  Respiratory: Clear to auscultation bilaterally, nonlabored respirations   Cardiovascular: RRR, no murmurs, rubs, or gallops, palpable pedal pulses bilaterally  Gastrointestinal: Positive bowel sounds, soft, nontender, nondistended  Musculoskeletal: No  bilateral ankle edema, no clubbing or cyanosis to extremities  Psychiatric: Appropriate affect, cooperative  Neurologic: Oriented x 3, strength symmetric in all extremities, Cranial Nerves grossly intact to confrontation, speech clear  Skin: No rashes      Results Reviewed:  I have personally reviewed most recent indicated data and agree with findings including:  [x]  Laboratory  [x]  Radiology  [x]  EKG/Telemetry  []  Pathology  [x]  Cardiac/Vascular Studies  [x]  Old records  []  Other:  Most pertinent findings include:    CT chest - patulous esophagus  Neg trop/ekg      LAB RESULTS:      Lab 02/07/21  0233 02/02/21  1357   WBC 12.51* 8.90   HEMOGLOBIN 16.2 15.2   HEMATOCRIT 47.7 44.2   PLATELETS 232 218   NEUTROS ABS 8.85* 7.30*   IMMATURE GRANS (ABS) 0.04 0.04   LYMPHS ABS 2.54 1.20   MONOS ABS 0.96* 0.36   EOS ABS 0.07 0.00   MCV 90.2 89.8         Lab 02/07/21  0242 02/07/21  0240 02/02/21  1357   SODIUM 137  --  139   POTASSIUM 4.2  --  4.0   CHLORIDE 98  --  103   CO2 27.0  --  24.0   ANION GAP 12.0  --  12.0   BUN 24*  --  19   CREATININE 1.11 1.10 0.78   GLUCOSE 98  --  120*   CALCIUM 10.1  --  9.3         Lab 02/07/21  0242 02/02/21  1357   TOTAL PROTEIN 7.7 7.1   ALBUMIN 4.80 4.40   GLOBULIN 2.9 2.7   ALT (SGPT) 12 11   AST (SGOT) 13 11   BILIRUBIN 0.9 0.6   ALK PHOS 65 57   LIPASE 32 21         Lab 02/07/21  0242 02/02/21  1554 02/02/21  1357   PROBNP 42.4  --  93.7   TROPONIN T <0.010 <0.010 <0.010                 Brief Urine Lab Results     None        Microbiology Results (last 10 days)     Procedure Component Value - Date/Time    COVID PRE-OP / PRE-PROCEDURE SCREENING ORDER (NO ISOLATION) - Swab, Nasopharynx [[id number]] Collected: 02/02/21 1945    Lab Status: Final result Specimen: Swab from Nasopharynx Updated: 02/03/21 1318    Narrative:      The following orders were created for panel order COVID PRE-OP / PRE-PROCEDURE SCREENING ORDER (NO ISOLATION) - Swab, Nasopharynx.  Procedure                                Abnormality         Status                     ---------                               -----------         ------                     COVID-19 PCR, [email or web address]...[[id number]]                      Final result                 Please view results for these tests on the individual orders.    COVID-19 PCR, [facility], NP SWAB IN LEXAR VIRAL TRANSPORT MEDIA 24-30 HR TAT - Swab, Nasopharynx [[id number]] Collected: 02/02/21 1945    Lab Status: Final result Specimen: Swab from Nasopharynx Updated: 02/03/21 1318     SARS-CoV-2 JESSICA Not Detected          Ct Angiogram Abdomen Pelvis    Result Date: 2/7/2021  CTA Chest, CTA Abdomen Pelvis INDICATION: Chest tightness. Possible aortic dissection. TECHNIQUE: CT angiogram of the chest, abdomen, and pelvis with and without IV contrast. 3-D reconstructions were obtained and reviewed.   Radiation dose reduction techniques included automated exposure control or exposure modulation based on body size. Count of known CT and cardiac nuc med studies performed in previous 12 months: 0. COMPARISON: None available. FINDINGS: The thoracoabdominal aorta is normal in course and caliber. No dissection is seen. There is some mild atherosclerotic disease in the abdominal aorta. Iliac vasculature in the pelvis is normal. The KELLEN and SMA are widely patent. Bilateral main renal arteries are widely patent. There is a tiny left upper pole accessory renal artery that is also widely patent. Celiac trunk is widely patent. Exam is not tailored for evaluation of the pulmonary arteries, but there is no clear evidence of acute pulmonary embolus. There is no pleural or pericardial effusion. There is no thoracic adenopathy. The esophagus is patulous and fluid-filled, all of which may be due to gastroesophageal reflux. Potentially, scleroderma could result in a patulous esophagus. There is no evidence of an obstructing lesion at the GE junction. The lungs are clear. No CT findings present to indicate  pneumonia. Note: CT may be negative in the earliest stages of COVID-19. Gallbladder is contracted. No biliary obstruction is seen. There is a small right renal cyst. Solid abdominal organs are otherwise normal. The appendix is normal. There are scattered colonic diverticula, but there is no evidence of acute diverticulitis. There is no small bowel obstruction. Urinary bladder is mildly distended, and there is a right posterolateral bladder diverticulum. No free fluid or adenopathy is seen. No fractures are identified in the thoracolumbar spine.     Impression: 1. No aortic aneurysm or dissection. 2. No evidence for pulmonary embolism within the limits of the study. 3. Negative for pneumonia. 4. Fluid-filled patulous esophagus, possibly simply the result of reflux disease. Consider nonemergent upper GI series for follow-up. 5. Colonic diverticulosis without acute diverticulitis. Otherwise normal GI tract, including the appendix. 6. Additional findings as above. Signer Name: Slick Booker MD  Signed: 2/7/2021 4:02 AM  Workstation Name: [email or web address]  [facility]    Ct Angiogram Chest    Result Date: 2/7/2021  CTA Chest, CTA Abdomen Pelvis INDICATION: Chest tightness. Possible aortic dissection. TECHNIQUE: CT angiogram of the chest, abdomen, and pelvis with and without IV contrast. 3-D reconstructions were obtained and reviewed.   Radiation dose reduction techniques included automated exposure control or exposure modulation based on body size. Count of known CT and cardiac nuc med studies performed in previous 12 months: 0. COMPARISON: None available. FINDINGS: The thoracoabdominal aorta is normal in course and caliber. No dissection is seen. There is some mild atherosclerotic disease in the abdominal aorta. Iliac vasculature in the pelvis is normal. The KELLEN and SMA are widely patent. Bilateral main renal arteries are widely patent. There is a tiny left upper pole accessory renal artery that is  also widely patent. Celiac trunk is widely patent. Exam is not tailored for evaluation of the pulmonary arteries, but there is no clear evidence of acute pulmonary embolus. There is no pleural or pericardial effusion. There is no thoracic adenopathy. The esophagus is patulous and fluid-filled, all of which may be due to gastroesophageal reflux. Potentially, scleroderma could result in a patulous esophagus. There is no evidence of an obstructing lesion at the GE junction. The lungs are clear. No CT findings present to indicate pneumonia. Note: CT may be negative in the earliest stages of COVID-19. Gallbladder is contracted. No biliary obstruction is seen. There is a small right renal cyst. Solid abdominal organs are otherwise normal. The appendix is normal. There are scattered colonic diverticula, but there is no evidence of acute diverticulitis. There is no small bowel obstruction. Urinary bladder is mildly distended, and there is a right posterolateral bladder diverticulum. No free fluid or adenopathy is seen. No fractures are identified in the thoracolumbar spine.     Impression: 1. No aortic aneurysm or dissection. 2. No evidence for pulmonary embolism within the limits of the study. 3. Negative for pneumonia. 4. Fluid-filled patulous esophagus, possibly simply the result of reflux disease. Consider nonemergent upper GI series for follow-up. 5. Colonic diverticulosis without acute diverticulitis. Otherwise normal GI tract, including the appendix. 6. Additional findings as above. Signer Name: Slick Booker MD  Signed: 2/7/2021 4:02 AM  Workstation Name: [facility]  [facility]           Assessment/Plan   Assessment & Plan       Chest pain    Elevated blood-pressure reading without diagnosis of hypertension    Abnormal computed tomography of esophagus      1. Atypical chest pain   -  less likely cardiac due to abnormal CTA chest showing fluid filled patulous esophagus  - HEART score   (0.9-1.7% MACE)  - asa 324 mg given in ED  - neg trop, EKG  - no stress available on Sunday, will consult cardiology for evaluation  - start PPI  - telemetry  - repeat COVID pending    2. Elevated blood pressure  - denies prior history of hypertension  - consider initiating low dose antihypertensive, monitor VS overnight        DVT prophylaxis:  lovenox SQ      CODE STATUS:  *full code  Code Status and Medical Interventions:   Ordered at: 02/07/21 0350     Code Status:    CPR     Medical Interventions (Level of Support Prior to Arrest):    Full         This note has been completed as part of a split-shared workflow.     Signature: Electronically signed by PIA Main, 02/07/21, 3:53 AM EST

## 2021-02-07 NOTE — PROGRESS NOTES
[facility] Medicine Services  ADMISSION FOLLOW-UP NOTE          Patient admitted after midnight, H&P by my partner performed earlier on today's date reviewed.  Interim findings, labs, and charting also reviewed.        The [facility] Problem List has been managed and updated to include any new diagnoses:  Active Hospital Problems    Diagnosis  POA   • **Chest pain [R07.9]  Yes   • Elevated blood-pressure reading without diagnosis of hypertension [R03.0]  Unknown   • Abnormal computed tomography of esophagus [R93.3]  Unknown      Resolved Hospital Problems   No resolved problems to display.       66 yo M with no significant PMH who presents due to chest tightness, recurrent for the past few weeks and unrelieved despite multiple different ER visits. He had a recent orthodontic procedure after which he had some sort of allergic reaction with pain and swelling in the face--received Medrol dose pack with improvement but when he came off the steroids, his chest tightness returned. Hypertensive on arrival at 178/96 (does not carry diagnosis of HTN). CTA chest showed fluid filled patulous esophagus probably secondary to reflux. Initial EKG and troponin were negative.    Atypical chest pain  --Possibly related to GERD given CTA chest findings and recent steroid use which could have made it worse. Started PPI. Also discussed possibility of pill induced esophagitis but he had recently taken Azithromycin, not Doxycycline which is more commonly reported as a culprit. Will have SLP evaluate as well.   --Negative troponins and EKG.  --Cardiology consulted for further recommendations but would likely pursue stress test either as inpatient or outpatient.    Elevated blood pressure  --Denies prior hx of HTN.  --Monitor and start antihypertensive regimen as needed.  --Will need close PCP follow up after discharge.      Teresa Faith MD  02/07/21

## 2021-02-07 NOTE — CONSULTS
Cardiac Electrophysiology In Patient Consultation        [facility]     Consultation      PATIENT NAME:  Gigi Cooney    :  1955 AGE: 65 y.o.     Date of Admission:  2021  Date of Consultation:  2021      Primary Cardiologist: None    Subjective      REASON FOR CONSULT: Abdominal pain lower chest pain    CHIEF COMPLAINT: Abdominal pain lower chest pain  Chief Complaint   Patient presents with   • Chest Pain       HISTORY OF PRESENT ILLNESS: Very pleasant 65-year-old gentleman who is a  at a [employer] who is had now pain intermittently on and off for last several days associated with a dental procedure and receiving azithromycin.  Pain is really in the upper abdomen rather than lower chest he says.  He took a Solu-Medrol Dosepak it got better but then as soon as he would stop it it got worse again.  Pain is not elicited by exertion or any kind of moving around.  It is a dull aching it gets gradually worse and then gradually better.  It is not relieved by rest.  It is not associated with shortness of breath.  Has had no palpitations syncope presyncope.    PAST MEDICAL HISTORY  Past Medical History:   Diagnosis Date   • Allergies        SURGICAL HISTORY   has a past surgical history that includes Tonsillectomy.     SOCIAL HISTORY  Social History     Socioeconomic History   • Marital status: Single     Spouse name: Not on file   • Number of children: Not on file   • Years of education: Not on file   • Highest education level: Not on file   Tobacco Use   • Smoking status: Never Smoker   • Smokeless tobacco: Never Used   Substance and Sexual Activity   • Alcohol use: Not Currently   • Drug use: Never   • Sexual activity: Defer       FAMILY HISTORY  family history is not on file.     MEDICATIONS  Prior to Admission medications    Medication Sig Start Date End Date Taking? Authorizing Provider   diphenhydrAMINE (Benadryl Allergy) 25 mg capsule Take 25  "mg by mouth 3 (Three) Times a Day. Recently prescribed. Patient has been taking 12.5 mg once a day since yesterday   Yes Provider, MD Corbin   famotidine (PEPCID) 20 MG tablet Take 20 mg by mouth Daily.   Yes Provider, MD Corbin   methylPREDNISolone (MEDROL) 4 MG dose pack Take 1 tablet by mouth 1 (One) Time. Take as directed on package instructions. Only one tablet remaining in home dose packet   Yes Provider, MD Corbin       ALLERGIES  Allergies   Allergen Reactions   • Rocephin [Ceftriaxone] Anaphylaxis   • Azithromycin Other (See Comments)     other       REVIEW OF SYSTEMS  Constitutional: No fevers or chills, no recent weight gain or weight loss or fatigue  Eyes: No visual loss, blurred vision, double vision, yellow sclerae.  ENT: No headaches, hearing loss, vertigo, congestion or sore throat.   Cardiovascular: Per HPI  Respiratory: No cough or wheezing, no sputum production, no hematemesis   Gastrointestinal: No abdominal pain, no nausea, vomiting, constipation, diarrhea, melena.   Genitourinary: No dysuria, hematuria or increased frequency.  Musculoskeletal:  No gait disturbance, weakness or joint pain or stiffness  Integumentary: No rashes, urticaria, ulcers or sores.   Neurological: No headache, dizziness, syncope, paralysis, ataxia, no prior CVA/TIA  Psychiatric: No anxiety, or depression  Endocrine: No diaphoresis, cold or heat intolerance. No polyuria or polydipsia.   Hematologic/Lymphatic: No anemia, abnormal bruising or bleeding. No history of DVT/PE.      Objective     VITAL SIGNS: /75 (BP Location: Left arm, Patient Position: Lying)   Pulse 53   Temp 98.5 °F (36.9 °C) (Oral)   Resp 18   Ht 190.5 cm (75\")   Wt 109 kg (241 lb 1.6 oz)   SpO2 97%   BMI 30.14 kg/m²      ADMIT WEIGHT:  109 kg (240 lb)  BMI: Body mass index is 30.14 kg/m².    Admission Weight: 109 kg (240 lb)     PHYSICAL EXAM  General appearance: Awake, alert, cooperative  Head: Normocephalic, without obvious " "abnormality, atraumatic  Eyes: Conjunctivae/corneas clear, EOMs intact  Neck: no adenopathy, no carotid bruit, no JVD and thyroid: not enlarged  Lungs: clear to auscultation bilaterally and no rhonchi or crackles\", ' symmetric  Heart: regular rate and rhythm, S1, S2 normal, no murmur, click, rub or gallop  Abdomen: Soft, non-tender, bowel sounds normal,  no organomegaly  Extremities: extremities normal, atraumatic, no cyanosis or edema  Skin: Skin color, turgor normal, no rashes or lesions  Neurologic: Grossly normal     CBC:   Results from last 7 days   Lab Units 02/07/21  0233 02/02/21  1357   WBC 10*3/mm3 12.51* 8.90   HEMOGLOBIN g/dL 16.2 15.2   HEMATOCRIT % 47.7 44.2   MCV fL 90.2 89.8   PLATELETS 10*3/mm3 232 218         BMP:  Results from last 7 days   Lab Units 02/07/21  0242 02/07/21  0240 02/02/21  1357   POTASSIUM mmol/L 4.2  --  4.0   CHLORIDE mmol/L 98  --  103   CO2 mmol/L 27.0  --  24.0   BUN mg/dL 24*  --  19   CREATININE mg/dL 1.11 1.10 0.78   GLUCOSE mg/dL 98  --  120*   CALCIUM mg/dL 10.1  --  9.3     PT/INR:     APTT:     MAG:     D Dimer:     Troponin I     ProBNP     Lipid Panel:  No results found for: CHOL, TRIG, HDL    CURRENT MEDICATIONS:    Current Facility-Administered Medications:   •  acetaminophen (TYLENOL) tablet 650 mg, 650 mg, Oral, Q4H PRN **OR** acetaminophen (TYLENOL) 160 MG/5ML solution 650 mg, 650 mg, Oral, Q4H PRN **OR** acetaminophen (TYLENOL) suppository 650 mg, 650 mg, Rectal, Q4H PRN, Sherron Mena PA  •  aluminum-magnesium hydroxide-simethicone (MAALOX MAX) 400-400-40 MG/5ML 15 mL, Lidocaine Viscous HCl (XYLOCAINE) 2 % 15 mL suspension, , Oral, Once PRN, Teresa Faith MD  •  enoxaparin (LOVENOX) syringe 40 mg, 40 mg, Subcutaneous, Q24H, Sherron Mena PA, 40 mg at 02/07/21 0634  •  melatonin tablet 5 mg, 5 mg, Oral, Nightly PRN, Sherron Mena PA  •  ondansetron (ZOFRAN) injection 4 mg, 4 mg, Intravenous, Q6H PRN, Sherron Mena PA  •  pantoprazole (PROTONIX) " EC tablet 40 mg, 40 mg, Oral, Q AM, Teresa Faith MD, 40 mg at 02/07/21 0803  •  sodium chloride 0.9 % flush 10 mL, 10 mL, Intravenous, PRN, Anthony Brandon MD  •  sodium chloride 0.9 % flush 10 mL, 10 mL, Intravenous, Q12H, Sherron Mena PA, 10 mL at 02/07/21 0803  •  sodium chloride 0.9 % flush 10 mL, 10 mL, Intravenous, PRN, Sherron Mena PA  CONTINUOUS INFUSIONS:         EKG: Noted.  Normal sinus rhythm.  ECHO: None  STRESS: None  CATH: None  CXR: Normal  TELEMETRY: Normal sinus rhythm    Assessment & Plan     65-year-old gentleman with atypical chest symptoms serial negative troponins, negative COVID-19 test, nondiagnostic CT angiogram.    We will evaluate him with an exercise stress echo.  If this is unremarkable he can be is discharged home.   Diagnosis Plan   1. Chest pain, unspecified type     2. Dysphagia, unspecified type           Osman Mckeon, , [facility], [facility]  Cardiac Electrophysiologist  [facility] / [facility]      This note was completed using a voice transcription system. Every effort was made to ensure accuracy. However, inadvertent computerized transcription errors may be present.

## 2021-02-07 NOTE — ED PROVIDER NOTES
Subjective   Gigi Cooney is a 65 yr old male that presents to the ER with c/o Chest pain.  Patient explains that over the past 2 weeks he has had a tightness in his chest.  He explains that originally the episode was following a dental procedure and being placed on a Z-Shane.  He denies after the third dose he experienced difficulty swallowing and tightness in his chest/ abdomen.  Patient was seen in our emergency department he had a dose of Benadryl, Pepcid, Solu-Medrol.  Patient was placed on a Medrol Dosepak.  He advises that in between doses of the steroid he would experience the similar chest tightness.  Patient has contacted his PCP regarding this issue.  He advises over the last few days he has had fluttering in his chest as well as the tightness.  He denies any shortness of breath.  Denies nausea, vomiting.  Patient has experienced weakness in his bilateral lower extremities.  Patient denies any cardiac history. He denies any medical history. Pt explains that he is very healthy.        History provided by:  Patient   used: No    Chest Pain  Pain location:  Substernal area  Pain quality: tightness    Pain severity:  Mild  Timing:  Constant  Progression:  Unchanged  Relieved by:  Nothing  Worsened by:  Nothing  Associated symptoms: palpitations and weakness    Associated symptoms: no abdominal pain, no cough, no dizziness, no fever, no lower extremity edema, no nausea, no shortness of breath and no vomiting    Risk factors: male sex    Risk factors: no diabetes mellitus, no high cholesterol, no hypertension, no prior DVT/PE and no smoking        Review of Systems   Constitutional: Negative for chills and fever.   Respiratory: Negative for cough and shortness of breath.    Cardiovascular: Positive for chest pain and palpitations.   Gastrointestinal: Negative for abdominal pain, nausea and vomiting.   Genitourinary: Negative for dysuria, frequency and urgency.   Neurological: Positive for  weakness. Negative for dizziness.   All other systems reviewed and are negative.      Past Medical History:   Diagnosis Date   • Allergies        Allergies   Allergen Reactions   • Rocephin [Ceftriaxone] Anaphylaxis   • Azithromycin Other (See Comments)     other       Past Surgical History:   Procedure Laterality Date   • TONSILLECTOMY         No family history on file.    Social History     Socioeconomic History   • Marital status: Single     Spouse name: Not on file   • Number of children: Not on file   • Years of education: Not on file   • Highest education level: Not on file   Tobacco Use   • Smoking status: Never Smoker   Substance and Sexual Activity   • Alcohol use: Not Currently           Objective   Physical Exam  Vitals signs and nursing note reviewed.   Constitutional:       Appearance: Normal appearance. He is well-developed. He is not ill-appearing or toxic-appearing.   HENT:      Head: Normocephalic and atraumatic.   Eyes:      General: Lids are normal.      Extraocular Movements: Extraocular movements intact.      Conjunctiva/sclera: Conjunctivae normal.      Pupils: Pupils are equal, round, and reactive to light.   Neck:      Musculoskeletal: Full passive range of motion without pain and normal range of motion.      Trachea: Trachea normal.   Cardiovascular:      Rate and Rhythm: Normal rate and regular rhythm.      Pulses: Normal pulses.      Heart sounds: Normal heart sounds.   Pulmonary:      Effort: Pulmonary effort is normal. No respiratory distress.      Breath sounds: Normal breath sounds. No decreased breath sounds, wheezing, rhonchi or rales.   Abdominal:      General: Bowel sounds are normal.      Palpations: Abdomen is soft.      Tenderness: There is no abdominal tenderness.   Musculoskeletal: Normal range of motion.   Skin:     General: Skin is warm and dry.      Findings: No rash.   Neurological:      Mental Status: He is alert and oriented to person, place, and time.      Cranial  Nerves: No cranial nerve deficit.   Psychiatric:         Mood and Affect: Mood is anxious.         Speech: Speech normal.         Behavior: Behavior normal. Behavior is cooperative.         Procedures           ED Course  ED Course as of Feb 07 0400   Sun Feb 07, 2021   0253 WBC(!): 12.51 [KG]   0357 Dr. Cerda was contacted.  She agrees to admit the patient.    [KG]      ED Course User Index  [KG] Mary Balderas, APRN           Recent Results (from the past 24 hour(s))   Light Blue Top    Collection Time: 02/07/21  2:33 AM   Result Value Ref Range    Extra Tube hold for add-on    Lavender Top    Collection Time: 02/07/21  2:33 AM   Result Value Ref Range    Extra Tube hold for add-on    Gold Top - SST    Collection Time: 02/07/21  2:33 AM   Result Value Ref Range    Extra Tube Hold for add-ons.    Gray Top - Ice    Collection Time: 02/07/21  2:33 AM   Result Value Ref Range    Extra Tube Hold for add-ons.    CBC Auto Differential    Collection Time: 02/07/21  2:33 AM    Specimen: Blood   Result Value Ref Range    WBC 12.51 (H) 3.40 - 10.80 10*3/mm3    RBC 5.29 4.14 - 5.80 10*6/mm3    Hemoglobin 16.2 13.0 - 17.7 g/dL    Hematocrit 47.7 37.5 - 51.0 %    MCV 90.2 79.0 - 97.0 fL    MCH 30.6 26.6 - 33.0 pg    MCHC 34.0 31.5 - 35.7 g/dL    RDW 13.8 12.3 - 15.4 %    RDW-SD 45.9 37.0 - 54.0 fl    MPV 9.5 6.0 - 12.0 fL    Platelets 232 140 - 450 10*3/mm3    Neutrophil % 70.7 42.7 - 76.0 %    Lymphocyte % 20.3 19.6 - 45.3 %    Monocyte % 7.7 5.0 - 12.0 %    Eosinophil % 0.6 0.3 - 6.2 %    Basophil % 0.4 0.0 - 1.5 %    Immature Grans % 0.3 0.0 - 0.5 %    Neutrophils, Absolute 8.85 (H) 1.70 - 7.00 10*3/mm3    Lymphocytes, Absolute 2.54 0.70 - 3.10 10*3/mm3    Monocytes, Absolute 0.96 (H) 0.10 - 0.90 10*3/mm3    Eosinophils, Absolute 0.07 0.00 - 0.40 10*3/mm3    Basophils, Absolute 0.05 0.00 - 0.20 10*3/mm3    Immature Grans, Absolute 0.04 0.00 - 0.05 10*3/mm3    nRBC 0.0 0.0 - 0.2 /100 WBC   POC Creatinine    Collection  Time: 02/07/21  2:40 AM    Specimen: Blood   Result Value Ref Range    Creatinine 1.10 0.60 - 1.30 mg/dL   Troponin    Collection Time: 02/07/21  2:42 AM    Specimen: Blood   Result Value Ref Range    Troponin T <0.010 0.000 - 0.030 ng/mL   Comprehensive Metabolic Panel    Collection Time: 02/07/21  2:42 AM    Specimen: Blood   Result Value Ref Range    Glucose 98 65 - 99 mg/dL    BUN 24 (H) 8 - 23 mg/dL    Creatinine 1.11 0.76 - 1.27 mg/dL    Sodium 137 136 - 145 mmol/L    Potassium 4.2 3.5 - 5.2 mmol/L    Chloride 98 98 - 107 mmol/L    CO2 27.0 22.0 - 29.0 mmol/L    Calcium 10.1 8.6 - 10.5 mg/dL    Total Protein 7.7 6.0 - 8.5 g/dL    Albumin 4.80 3.50 - 5.20 g/dL    ALT (SGPT) 12 1 - 41 U/L    AST (SGOT) 13 1 - 40 U/L    Alkaline Phosphatase 65 39 - 117 U/L    Total Bilirubin 0.9 0.0 - 1.2 mg/dL    eGFR Non African Amer 66 >60 mL/min/1.73    Globulin 2.9 gm/dL    A/G Ratio 1.7 g/dL    BUN/Creatinine Ratio 21.6 7.0 - 25.0    Anion Gap 12.0 5.0 - 15.0 mmol/L   Lipase    Collection Time: 02/07/21  2:42 AM    Specimen: Blood   Result Value Ref Range    Lipase 32 13 - 60 U/L   BNP    Collection Time: 02/07/21  2:42 AM    Specimen: Blood   Result Value Ref Range    proBNP 42.4 0.0 - 900.0 pg/mL   Green Top (Gel)    Collection Time: 02/07/21  2:42 AM   Result Value Ref Range    Extra Tube Hold for add-ons.      Note: In addition to lab results from this visit, the labs listed above may include labs taken at another facility or during a different encounter within the last 24 hours. Please correlate lab times with ED admission and discharge times for further clarification of the services performed during this visit.    CT Angiogram Chest    (Results Pending)   CT Angiogram Abdomen Pelvis    (Results Pending)     Vitals:    02/07/21 0221 02/07/21 0222 02/07/21 0226 02/07/21 0227   BP:  178/96 162/90    BP Location:  Left arm     Patient Position:  Sitting     Pulse:  65 62 60   Resp:  18     Temp:  97.3 °F (36.3 °C)    "  TempSrc:  Temporal     SpO2:  97%  98%   Weight: 109 kg (240 lb)      Height: 190.5 cm (75\")        Medications   sodium chloride 0.9 % flush 10 mL (has no administration in time range)   aspirin chewable tablet 324 mg (324 mg Oral Given 2/7/21 0242)   iopamidol (ISOVUE-370) 76 % injection 100 mL (100 mL Intravenous Given 2/7/21 0326)     ECG/EMG Results (last 24 hours)     Procedure Component Value Units Date/Time    ECG 12 Lead [[id number]] Collected: 02/07/21 0228     Updated: 02/07/21 0228        ECG 12 Lead         ECG 12 Lead    (Results Pending)                                     HEART Score (for prediction of 6-week risk of major adverse cardiac event) reviewed and/or performed as part of the patient evaluation and treatment planning process.  The result associated with this review/performance is: 4       MDM    Final diagnoses:   Chest pain, unspecified type            Mary Balderas, PABLITO  02/07/21 0400    "

## 2021-02-07 NOTE — THERAPY EVALUATION
Acute Care - Speech Language Pathology   Swallow Initial Evaluation [facility]   Clinical Swallow Evaluation     Patient Name: Gigi Cooney  : 1955  MRN: [id number]  Today's Date: 2021               Admit Date: 2021    Visit Dx:     ICD-10-CM ICD-9-CM   1. Chest pain, unspecified type  R07.9 786.50   2. Dysphagia, unspecified type  R13.10 787.20     Patient Active Problem List   Diagnosis   • Chest pain   • Elevated blood-pressure reading without diagnosis of hypertension   • Abnormal computed tomography of esophagus     Past Medical History:   Diagnosis Date   • Allergies      Past Surgical History:   Procedure Laterality Date   • TONSILLECTOMY          SWALLOW EVALUATION (last 72 hours)      SLP Adult Swallow Evaluation     Row Name 21 0845                   Rehab Evaluation    Document Type  evaluation  -MP        Subjective Information  no complaints  -MP        Patient Observations  alert;cooperative  -MP        Patient/Family/Caregiver Comments/Observations  No family present  -MP        Care Plan Review  evaluation/treatment results reviewed;patient/other agree to care plan  -MP        Patient Effort  good  -MP           General Information    Patient Profile Reviewed  yes  -MP        Pertinent History Of Current Problem  Adm  w/ chest pain, elevated blood pressure. Has been ED 4 times over past 3 weeks. Self reports difficulty swallowing. CT angio chest w/ no PNA; fluid filled patulous esophagus, possibly reflux.  -MP        Current Method of Nutrition  regular textures;thin liquids  -MP        Precautions/Limitations, Vision  WFL;for purposes of eval  -MP        Precautions/Limitations, Hearing  WFL;for purposes of eval  -MP        Prior Level of Function-Communication  WFL  -MP        Prior Level of Function-Swallowing  no diet consistency restrictions;other (see comments) pt reports intermittent difficulty swallowing over past 2 wk  -MP        Plans/Goals Discussed with   patient;agreed upon  -MP        Barriers to Rehab  none identified  -MP        Patient's Goals for Discharge  patient did not state  -MP           Pain    Additional Documentation  Pain Scale: FACES Pre/Post-Treatment (Group)  -MP           Pain Scale: FACES Pre/Post-Treatment    Pain: FACES Scale, Pretreatment  0-->no hurt  -MP        Posttreatment Pain Rating  0-->no hurt  -MP           Oral Motor Structure and Function    Dentition Assessment  natural, present and adequate  -MP        Secretion Management  WNL/WFL  -MP        Mucosal Quality  moist, healthy  -MP           Oral Musculature and Cranial Nerve Assessment    Oral Motor General Assessment  WFL  -MP           General Eating/Swallowing Observations    Respiratory Support Currently in Use  room air  -MP        Eating/Swallowing Skills  self-fed;appropriate self-feeding skills observed  -MP        Positioning During Eating  upright in bed  -MP        Utensils Used  spoon;cup;straw  -MP        Consistencies Trialed  thin liquids;pureed;regular textures  -MP           Clinical Swallow Eval    Pharyngeal Phase  suspected pharyngeal impairment  -MP        Esophageal Phase  suspected esophageal impairment  -MP        Clinical Swallow Evaluation Summary  Intermittent throat clear t/o evaluation. C/o sticking in throat. ? esophageal. Will plan for MBS + limited upper GI tomorrow 2/8 to further assess.  -MP           Pharyngeal Phase Concerns    Pharyngeal Phase Concerns  throat clear  -MP        Throat Clear  other (see comments) intermittent t/o eval  -MP           Esophageal Phase Concerns    Esophageal Phase Concerns  sensation of material sticking  -MP        Sensation of Material Sticking  all consistencies  -MP           Clinical Impression    SLP Swallowing Diagnosis  R/O pharyngeal dysphagia;esophageal dysphagia  -MP        Functional Impact  risk of aspiration/pneumonia  -MP        Rehab Potential/Prognosis, Swallowing  good, to achieve stated therapy  goals  -        Swallow Criteria for Skilled Therapeutic Interventions Met  demonstrates skilled criteria  -MP           Recommendations    Predicted Duration Therapy Intervention (Days)  until discharge  -MP        SLP Diet Recommendation  regular textures;thin liquids  -MP        Recommended Diagnostics  VFSS (MBS);other (see comments) + limited upper GI tomorrow 2/8  -MP        Recommended Precautions and Strategies  upright posture during/after eating;general aspiration precautions;reflux precautions  -MP        Oral Care Recommendations  Oral Care BID/PRN  -MP        SLP Rec. for Method of Medication Administration  meds whole;with thin liquids;with pudding or applesauce;as tolerated  -MP        Monitor for Signs of Aspiration  yes;notify SLP if any concerns  -MP        Anticipated Discharge Disposition (SLP)  unknown;anticipate therapy at next level of care  -MP          User Key  (r) = Recorded By, (t) = Taken By, (c) = Cosigned By    Initials Name Effective Dates    Jason Celis, MS CCC-SLP 06/19/19 -           EDUCATION  The patient has been educated in the following areas:   Dysphagia (Swallowing Impairment).    SLP Recommendation and Plan  SLP Swallowing Diagnosis: R/O pharyngeal dysphagia, esophageal dysphagia  SLP Diet Recommendation: regular textures, thin liquids  Recommended Precautions and Strategies: upright posture during/after eating, general aspiration precautions, reflux precautions  SLP Rec. for Method of Medication Administration: meds whole, with thin liquids, with pudding or applesauce, as tolerated     Monitor for Signs of Aspiration: yes, notify SLP if any concerns  Recommended Diagnostics: VFSS (MBS), other (see comments)(+ limited upper GI tomorrow 2/8)  Swallow Criteria for Skilled Therapeutic Interventions Met: demonstrates skilled criteria  Anticipated Discharge Disposition (SLP): unknown, anticipate therapy at next level of care  Rehab Potential/Prognosis, Swallowing: good,  to achieve stated therapy goals     Predicted Duration Therapy Intervention (Days): until discharge                         Plan of Care Reviewed With: patient           Time Calculation:   Time Calculation- SLP     Row Name 02/07/21 0920             Time Calculation- SLP    SLP Start Time  0845  -MP      SLP Received On  02/07/21  -EUNICE        User Key  (r) = Recorded By, (t) = Taken By, (c) = Cosigned By    Initials Name Provider Type    Jason Celis MS CCC-SLP Speech and Language Pathologist          Therapy Charges for Today     Code Description Service Date Service Provider Modifiers Qty    [id number] HC ST EVAL ORAL PHARYNG SWALLOW 3 2/7/2021 Jason Bianchi MS CCC-SLP GN 1          Patient was not wearing a face mask and did not exhibit coughing during this therapy encounter.  Procedure performed was aerosolizing, involved close contact (within 6 feet for at least 15 minutes or longer), and did not involve contact with infectious secretions or specimens.  Therapist used appropriate personal protective equipment including gloves, standard procedure mask and eye protection.  Appropriate PPE was worn during the entire therapy session.  Hand hygiene was completed before and after therapy session.        MS GANESH Anthony  2/7/2021

## 2021-02-07 NOTE — PLAN OF CARE
Goal Outcome Evaluation:  Plan of Care Reviewed With: patient        SLP evaluation completed. Will  plan for MBS + limited upper GI tomorrow 2/8 to further assess . Please see note for further details and recommendations.

## 2021-02-08 ENCOUNTER — APPOINTMENT (OUTPATIENT)
Dept: GENERAL RADIOLOGY | Facility: HOSPITAL | Age: 66
End: 2021-02-08

## 2021-02-08 ENCOUNTER — APPOINTMENT (OUTPATIENT)
Dept: CARDIOLOGY | Facility: HOSPITAL | Age: 66
End: 2021-02-08

## 2021-02-08 VITALS
HEART RATE: 70 BPM | DIASTOLIC BLOOD PRESSURE: 80 MMHG | HEIGHT: 75 IN | WEIGHT: 241 LBS | BODY MASS INDEX: 29.97 KG/M2 | TEMPERATURE: 97.9 F | OXYGEN SATURATION: 96 % | RESPIRATION RATE: 18 BRPM | SYSTOLIC BLOOD PRESSURE: 138 MMHG

## 2021-02-08 LAB
ASCENDING AORTA: 3.1 CM
BH CV ECHO MEAS - AO MAX PG (FULL): 0.1 MMHG
BH CV ECHO MEAS - AO MAX PG: 6.1 MMHG
BH CV ECHO MEAS - AO MEAN PG (FULL): 0.23 MMHG
BH CV ECHO MEAS - AO MEAN PG: 3.5 MMHG
BH CV ECHO MEAS - AO ROOT AREA (BSA CORRECTED): 1.5
BH CV ECHO MEAS - AO ROOT AREA: 10.5 CM^2
BH CV ECHO MEAS - AO ROOT DIAM: 3.7 CM
BH CV ECHO MEAS - AO V2 MAX: 123.8 CM/SEC
BH CV ECHO MEAS - AO V2 MEAN: 87.9 CM/SEC
BH CV ECHO MEAS - AO V2 VTI: 32.2 CM
BH CV ECHO MEAS - ASC AORTA: 3.1 CM
BH CV ECHO MEAS - AVA(I,A): 3.3 CM^2
BH CV ECHO MEAS - AVA(I,D): 3.3 CM^2
BH CV ECHO MEAS - AVA(V,A): 4.1 CM^2
BH CV ECHO MEAS - AVA(V,D): 4.1 CM^2
BH CV ECHO MEAS - BSA(HAYCOCK): 2.4 M^2
BH CV ECHO MEAS - BSA: 2.4 M^2
BH CV ECHO MEAS - BZI_BMI: 30.1 KILOGRAMS/M^2
BH CV ECHO MEAS - BZI_METRIC_HEIGHT: 190.5 CM
BH CV ECHO MEAS - BZI_METRIC_WEIGHT: 109.3 KG
BH CV ECHO MEAS - EDV(CUBED): 63.7 ML
BH CV ECHO MEAS - EDV(MOD-SP2): 126 ML
BH CV ECHO MEAS - EDV(MOD-SP4): 141 ML
BH CV ECHO MEAS - EDV(TEICH): 69.7 ML
BH CV ECHO MEAS - EF(CUBED): 70 %
BH CV ECHO MEAS - EF(MOD-BP): 60 %
BH CV ECHO MEAS - EF(MOD-SP2): 61.9 %
BH CV ECHO MEAS - EF(MOD-SP4): 60.3 %
BH CV ECHO MEAS - EF(TEICH): 62.2 %
BH CV ECHO MEAS - ESV(CUBED): 19.1 ML
BH CV ECHO MEAS - ESV(MOD-SP2): 48 ML
BH CV ECHO MEAS - ESV(MOD-SP4): 56 ML
BH CV ECHO MEAS - ESV(TEICH): 26.3 ML
BH CV ECHO MEAS - FS: 33.1 %
BH CV ECHO MEAS - IVS/LVPW: 1.1
BH CV ECHO MEAS - IVSD: 1.2 CM
BH CV ECHO MEAS - LA DIMENSION: 4.2 CM
BH CV ECHO MEAS - LA/AO: 1.1
BH CV ECHO MEAS - LV DIASTOLIC VOL/BSA (35-75): 59.4 ML/M^2
BH CV ECHO MEAS - LV MASS(C)D: 163.2 GRAMS
BH CV ECHO MEAS - LV MASS(C)DI: 68.7 GRAMS/M^2
BH CV ECHO MEAS - LV MAX PG: 6 MMHG
BH CV ECHO MEAS - LV MEAN PG: 3.2 MMHG
BH CV ECHO MEAS - LV SYSTOLIC VOL/BSA (12-30): 23.6 ML/M^2
BH CV ECHO MEAS - LV V1 MAX: 122.8 CM/SEC
BH CV ECHO MEAS - LV V1 MEAN: 83.2 CM/SEC
BH CV ECHO MEAS - LV V1 VTI: 26.4 CM
BH CV ECHO MEAS - LVIDD: 4 CM
BH CV ECHO MEAS - LVIDS: 2.7 CM
BH CV ECHO MEAS - LVLD AP2: 9.6 CM
BH CV ECHO MEAS - LVLD AP4: 9.3 CM
BH CV ECHO MEAS - LVLS AP2: 8.6 CM
BH CV ECHO MEAS - LVLS AP4: 7.4 CM
BH CV ECHO MEAS - LVOT AREA (M): 4.2 CM^2
BH CV ECHO MEAS - LVOT AREA: 4.1 CM^2
BH CV ECHO MEAS - LVOT DIAM: 2.3 CM
BH CV ECHO MEAS - LVPWD: 1.2 CM
BH CV ECHO MEAS - MV A MAX VEL: 51.7 CM/SEC
BH CV ECHO MEAS - MV DEC TIME: 0.25 SEC
BH CV ECHO MEAS - MV E MAX VEL: 79.4 CM/SEC
BH CV ECHO MEAS - MV E/A: 1.5
BH CV ECHO MEAS - PI END-D VEL: 92.3 CM/SEC
BH CV ECHO MEAS - RAP SYSTOLE: 3 MMHG
BH CV ECHO MEAS - RVSP: 13 MMHG
BH CV ECHO MEAS - SI(AO): 141.8 ML/M^2
BH CV ECHO MEAS - SI(CUBED): 18.8 ML/M^2
BH CV ECHO MEAS - SI(LVOT): 45.4 ML/M^2
BH CV ECHO MEAS - SI(MOD-SP2): 32.8 ML/M^2
BH CV ECHO MEAS - SI(MOD-SP4): 35.8 ML/M^2
BH CV ECHO MEAS - SI(TEICH): 18.3 ML/M^2
BH CV ECHO MEAS - SV(AO): 336.9 ML
BH CV ECHO MEAS - SV(CUBED): 44.6 ML
BH CV ECHO MEAS - SV(LVOT): 107.7 ML
BH CV ECHO MEAS - SV(MOD-SP2): 78 ML
BH CV ECHO MEAS - SV(MOD-SP4): 85 ML
BH CV ECHO MEAS - SV(TEICH): 43.4 ML
BH CV ECHO MEAS - TR MAX PG: 10 MMHG
BH CV ECHO MEAS - TR MAX VEL: 155.1 CM/SEC
BH CV STRESS BP STAGE 1: NORMAL
BH CV STRESS BP STAGE 2: NORMAL
BH CV STRESS DURATION MIN STAGE 1: 3
BH CV STRESS DURATION MIN STAGE 2: 3
BH CV STRESS DURATION MIN STAGE 3: 1
BH CV STRESS DURATION SEC STAGE 1: 0
BH CV STRESS DURATION SEC STAGE 2: 0
BH CV STRESS DURATION SEC STAGE 3: 29
BH CV STRESS ECHO POST STRESS EJECTION FRACTION EF: 75 %
BH CV STRESS GRADE STAGE 1: 10
BH CV STRESS GRADE STAGE 2: 12
BH CV STRESS GRADE STAGE 3: 14
BH CV STRESS HR STAGE 1: 92
BH CV STRESS HR STAGE 2: 110
BH CV STRESS HR STAGE 3: 133
BH CV STRESS METS STAGE 1: 5
BH CV STRESS METS STAGE 2: 7.5
BH CV STRESS METS STAGE 3: 10
BH CV STRESS O2 STAGE 1: 96
BH CV STRESS O2 STAGE 2: 98
BH CV STRESS O2 STAGE 3: 98
BH CV STRESS PROTOCOL 1: NORMAL
BH CV STRESS RECOVERY BP: NORMAL MMHG
BH CV STRESS RECOVERY HR: 86 BPM
BH CV STRESS SPEED STAGE 1: 1.7
BH CV STRESS SPEED STAGE 2: 2.5
BH CV STRESS SPEED STAGE 3: 3.4
BH CV STRESS STAGE 1: 1
BH CV STRESS STAGE 2: 2
BH CV STRESS STAGE 3: 3
BH CV VAS BP LEFT ARM: NORMAL MMHG
BH CV XLRA - RV BASE: 4.8 CM
BH CV XLRA - RV LENGTH: 7.5 CM
BH CV XLRA - RV MID: 3.4 CM
LV EF 2D ECHO EST: 65 %
MAXIMAL PREDICTED HEART RATE: 155 BPM
PERCENT MAX PREDICTED HR: 85.81 %
STRESS BASELINE BP: NORMAL MMHG
STRESS BASELINE HR: 71 BPM
STRESS O2 SAT REST: 98 %
STRESS PERCENT HR: 101 %
STRESS POST ESTIMATED WORKLOAD: 10.1 METS
STRESS POST EXERCISE DUR MIN: 7 MIN
STRESS POST EXERCISE DUR SEC: 29 SEC
STRESS POST PEAK BP: NORMAL MMHG
STRESS POST PEAK HR: 133 BPM
STRESS TARGET HR: 132 BPM

## 2021-02-08 PROCEDURE — 74230 X-RAY XM SWLNG FUNCJ C+: CPT

## 2021-02-08 PROCEDURE — 99213 OFFICE O/P EST LOW 20 MIN: CPT | Performed by: INTERNAL MEDICINE

## 2021-02-08 PROCEDURE — G0378 HOSPITAL OBSERVATION PER HR: HCPCS

## 2021-02-08 PROCEDURE — 74240 X-RAY XM UPR GI TRC 1CNTRST: CPT

## 2021-02-08 PROCEDURE — 99217 PR OBSERVATION CARE DISCHARGE MANAGEMENT: CPT | Performed by: NURSE PRACTITIONER

## 2021-02-08 PROCEDURE — 93350 STRESS TTE ONLY: CPT

## 2021-02-08 PROCEDURE — 25010000002 ENOXAPARIN PER 10 MG: Performed by: PHYSICIAN ASSISTANT

## 2021-02-08 PROCEDURE — 93350 STRESS TTE ONLY: CPT | Performed by: INTERNAL MEDICINE

## 2021-02-08 PROCEDURE — 93018 CV STRESS TEST I&R ONLY: CPT | Performed by: INTERNAL MEDICINE

## 2021-02-08 PROCEDURE — 93352 ADMIN ECG CONTRAST AGENT: CPT | Performed by: INTERNAL MEDICINE

## 2021-02-08 PROCEDURE — 93017 CV STRESS TEST TRACING ONLY: CPT

## 2021-02-08 PROCEDURE — 93325 DOPPLER ECHO COLOR FLOW MAPG: CPT

## 2021-02-08 PROCEDURE — 25010000002 SULFUR HEXAFLUORIDE MICROSPH 60.7-25 MG RECONSTITUTED SUSPENSION: Performed by: INTERNAL MEDICINE

## 2021-02-08 PROCEDURE — 96372 THER/PROPH/DIAG INJ SC/IM: CPT

## 2021-02-08 PROCEDURE — 92611 MOTION FLUOROSCOPY/SWALLOW: CPT

## 2021-02-08 PROCEDURE — 93320 DOPPLER ECHO COMPLETE: CPT

## 2021-02-08 RX ORDER — PANTOPRAZOLE SODIUM 40 MG/1
40 TABLET, DELAYED RELEASE ORAL DAILY
Qty: 30 TABLET | Refills: 0 | Status: SHIPPED | OUTPATIENT
Start: 2021-02-08

## 2021-02-08 RX ORDER — BISOPROLOL FUMARATE 5 MG/1
5 TABLET, FILM COATED ORAL
Qty: 30 TABLET | Refills: 0 | Status: SHIPPED | OUTPATIENT
Start: 2021-02-08

## 2021-02-08 RX ORDER — BISOPROLOL FUMARATE 5 MG/1
5 TABLET, FILM COATED ORAL
Status: DISCONTINUED | OUTPATIENT
Start: 2021-02-08 | End: 2021-02-08 | Stop reason: HOSPADM

## 2021-02-08 RX ADMIN — BARIUM SULFATE 100 ML: 0.81 POWDER, FOR SUSPENSION ORAL at 15:01

## 2021-02-08 RX ADMIN — BISOPROLOL FUMARATE 5 MG: 5 TABLET, FILM COATED ORAL at 17:37

## 2021-02-08 RX ADMIN — SULFUR HEXAFLUORIDE 4 ML: KIT at 16:15

## 2021-02-08 RX ADMIN — SODIUM CHLORIDE, PRESERVATIVE FREE 10 ML: 5 INJECTION INTRAVENOUS at 08:42

## 2021-02-08 RX ADMIN — ENOXAPARIN SODIUM 40 MG: 40 INJECTION SUBCUTANEOUS at 05:27

## 2021-02-08 RX ADMIN — BARIUM SULFATE 20 ML: 400 PASTE ORAL at 15:01

## 2021-02-08 RX ADMIN — PANTOPRAZOLE SODIUM 40 MG: 40 TABLET, DELAYED RELEASE ORAL at 05:27

## 2021-02-08 NOTE — DISCHARGE SUMMARY
[facility] Medicine Services  DISCHARGE SUMMARY    Patient Name: Gigi Cooney  : 1955  MRN: [id number]    Date of Admission: 2021  2:22 AM  Date of Discharge:  2021  Primary Care Physician: Provider, No Known    Consults     Date and Time Order Name Status Description    2021 0553 Inpatient Cardiology Consult Completed           Hospital Course     Presenting Problem:   Chest pain [R07.9]  Chest pain [R07.9]    Active Hospital Problems    Diagnosis  POA   • **Chest pain [R07.9]  Yes   • Elevated blood-pressure reading without diagnosis of hypertension [R03.0]  Yes   • Abnormal computed tomography of esophagus [R93.3]  Yes      Resolved Hospital Problems   No resolved problems to display.          Hospital Course:  Gigi Cooney is a 65 y.o. male who presents due to chest tightness, recurrent for the past few weeks and unrelieved despite multiple different ER visits. He had a recent orthodontic procedure after which he had some sort of allergic reaction with pain and swelling in the face--received Medrol dose pack with improvement but when he came off the steroids, his chest tightness returned. Hypertensive on arrival at 178/96 (does not carry diagnosis of HTN). CTA chest showed fluid filled patulous esophagus probably secondary to reflux. Initial EKG and troponin were negative. Cardiology did Stress ECHO on  which WNL. Bisoprolol was started for blood pressure control. Patient will also be sent home with protonix for mild reflux.      Atypical chest pain  --Possibly related to GERD given CTA chest findings and recent steroid use which could have made it worse. Started PPI. Also discussed possibility of pill induced esophagitis but he had recently taken Azithromycin, not Doxycycline which is more commonly reported as a culprit.  -- SLP has seen and did MBS which showed mild gastroesophageal reflux to the level of the midesophagus. Will cont PPI at  discharge   --Negative troponins and EKG.  --Cardiology consulted   -- pt had a stress ECHO which was wnl and has been cleared for discharger by Cardiology     Elevated blood pressure  --Denies prior hx of HTN.  --bisoprolol was started by cardiology   --Will need close PCP follow up after discharge.    Patient has remained clinically stable and will be discharged home today.        Discharge Follow Up Recommendations for outpatient labs/diagnostics:   follow up with pcp one week     Day of Discharge     HPI:   Patient is resting in bed in NAD. He has not had any other chest pain since admit.     Review of Systems  Gen- No fevers, chills  CV- No chest pain, palpitations  Resp- No cough, dyspnea  GI- No N/V/D, abd pain        Vital Signs:   Temp:  [98 °F (36.7 °C)-98.2 °F (36.8 °C)] 98 °F (36.7 °C)  Heart Rate:  [55-67] 67  Resp:  [16-18] 16  BP: (143-158)/(76-97) 158/86     Physical Exam:  Constitutional: No acute distress, awake, alert  HENT: NCAT, mucous membranes moist  Respiratory: Clear to auscultation bilaterally, respiratory effort normal room air 96%  Cardiovascular: RRR, no murmurs, rubs, or gallops  Gastrointestinal: Positive bowel sounds, soft, nontender, nondistended  Musculoskeletal: No bilateral ankle edema  Psychiatric: Appropriate affect, cooperative  Neurologic: Oriented x 3, strength symmetric in all extremities, Cranial Nerves grossly intact to confrontation, speech clear  Skin: No rashes      Pertinent  and/or Most Recent Results     LAB RESULTS:      Lab 02/07/21  0233 02/02/21  1357   WBC 12.51* 8.90   HEMOGLOBIN 16.2 15.2   HEMATOCRIT 47.7 44.2   PLATELETS 232 218   NEUTROS ABS 8.85* 7.30*   IMMATURE GRANS (ABS) 0.04 0.04   LYMPHS ABS 2.54 1.20   MONOS ABS 0.96* 0.36   EOS ABS 0.07 0.00   MCV 90.2 89.8         Lab 02/07/21  0242 02/07/21  0240 02/02/21  1357   SODIUM 137  --  139   POTASSIUM 4.2  --  4.0   CHLORIDE 98  --  103   CO2 27.0  --  24.0   ANION GAP 12.0  --  12.0   BUN 24*  --  19    CREATININE 1.11 1.10 0.78   GLUCOSE 98  --  120*   CALCIUM 10.1  --  9.3         Lab 02/07/21  0242 02/02/21  1357   TOTAL PROTEIN 7.7 7.1   ALBUMIN 4.80 4.40   GLOBULIN 2.9 2.7   ALT (SGPT) 12 11   AST (SGOT) 13 11   BILIRUBIN 0.9 0.6   ALK PHOS 65 57   LIPASE 32 21         Lab 02/07/21  0934 02/07/21  0423 02/07/21  0242 02/02/21  1554 02/02/21  1357   PROBNP  --   --  42.4  --  93.7   TROPONIN T <0.010 <0.010 <0.010 <0.010 <0.010                 Brief Urine Lab Results     None        Microbiology Results (last 10 days)     Procedure Component Value - Date/Time    COVID PRE-OP / PRE-PROCEDURE SCREENING ORDER (NO ISOLATION) - Swab, Nasopharynx [[id number]]  (Normal) Collected: 02/07/21 0423    Lab Status: Final result Specimen: Swab from Nasopharynx Updated: 02/07/21 0504    Narrative:      The following orders were created for panel order COVID PRE-OP / PRE-PROCEDURE SCREENING ORDER (NO ISOLATION) - Swab, Nasopharynx.  Procedure                               Abnormality         Status                     ---------                               -----------         ------                     COVID-19 and FLU A/B PCR...[[id number]]  Normal              Final result                 Please view results for these tests on the individual orders.    COVID-19 and FLU A/B PCR - Swab, Nasopharynx [[id number]]  (Normal) Collected: 02/07/21 0423    Lab Status: Final result Specimen: Swab from Nasopharynx Updated: 02/07/21 0504     COVID19 Not Detected     Influenza A PCR Not Detected     Influenza B PCR Not Detected    Narrative:      Fact sheet for providers: https://www.fda.gov/media/136836/download    Fact sheet for patients: https://www.fda.gov/media/396342/download    Test performed by PCR.    COVID PRE-OP / PRE-PROCEDURE SCREENING ORDER (NO ISOLATION) - Swab, Nasopharynx [[id number]] Collected: 02/02/21 1945    Lab Status: Final result Specimen: Swab from Nasopharynx Updated: 02/03/21 1318    Narrative:      The following  orders were created for panel order COVID PRE-OP / PRE-PROCEDURE SCREENING ORDER (NO ISOLATION) - Swab, Nasopharynx.  Procedure                               Abnormality         Status                     ---------                               -----------         ------                     COVID-19 PCR, [facility]...[[id number]]                      Final result                 Please view results for these tests on the individual orders.    COVID-19 PCR, [facility], NP SWAB IN [facility] VIRAL TRANSPORT MEDIA 24-30 HR TAT - Swab, Nasopharynx [[id number]] Collected: 02/02/21 1945    Lab Status: Final result Specimen: Swab from Nasopharynx Updated: 02/03/21 1318     SARS-CoV-2 JESSICA Not Detected          Ct Angiogram Abdomen Pelvis    Result Date: 2/7/2021  CTA Chest, CTA Abdomen Pelvis INDICATION: Chest tightness. Possible aortic dissection. TECHNIQUE: CT angiogram of the chest, abdomen, and pelvis with and without IV contrast. 3-D reconstructions were obtained and reviewed.   Radiation dose reduction techniques included automated exposure control or exposure modulation based on body size. Count of known CT and cardiac nuc med studies performed in previous 12 months: 0. COMPARISON: None available. FINDINGS: The thoracoabdominal aorta is normal in course and caliber. No dissection is seen. There is some mild atherosclerotic disease in the abdominal aorta. Iliac vasculature in the pelvis is normal. The KELLEN and SMA are widely patent. Bilateral main renal arteries are widely patent. There is a tiny left upper pole accessory renal artery that is also widely patent. Celiac trunk is widely patent. Exam is not tailored for evaluation of the pulmonary arteries, but there is no clear evidence of acute pulmonary embolus. There is no pleural or pericardial effusion. There is no thoracic adenopathy. The esophagus is patulous and fluid-filled, all of which may be due to gastroesophageal reflux. Potentially, scleroderma could result in a  patulous esophagus. There is no evidence of an obstructing lesion at the GE junction. The lungs are clear. No CT findings present to indicate pneumonia. Note: CT may be negative in the earliest stages of COVID-19. Gallbladder is contracted. No biliary obstruction is seen. There is a small right renal cyst. Solid abdominal organs are otherwise normal. The appendix is normal. There are scattered colonic diverticula, but there is no evidence of acute diverticulitis. There is no small bowel obstruction. Urinary bladder is mildly distended, and there is a right posterolateral bladder diverticulum. No free fluid or adenopathy is seen. No fractures are identified in the thoracolumbar spine.     1. No aortic aneurysm or dissection. 2. No evidence for pulmonary embolism within the limits of the study. 3. Negative for pneumonia. 4. Fluid-filled patulous esophagus, possibly simply the result of reflux disease. Consider nonemergent upper GI series for follow-up. 5. Colonic diverticulosis without acute diverticulitis. Otherwise normal GI tract, including the appendix. 6. Additional findings as above. Signer Name: Slick Booker MD  Signed: 2/7/2021 4:02 AM  Workstation Name: [facility]  [facility] Video Swallow With Speech Single Contrast    Result Date: 2/8/2021  EXAMINATION: FL VIDEO SWALLOW W SPEECH SINGLE-CONTRAST-, FL LIMITED UGI FOR MBS REFLUX SINGLE-CONTRAST-  INDICATION: dysphagia; R07.9-Chest pain, unspecified; R13.10-Dysphagia, unspecified  TECHNIQUE: 40 seconds of fluoroscopic time was used for this exam. 11 associated fluoroscopic loops were saved. The patient was evaluated in the seated lateral position while taking a variety of consistencies of barium by mouth under the direction of speech pathology.  COMPARISON: NONE  FINDINGS: 40 seconds of fluoroscopy provided for a modified barium swallow, and limited upper GI series. Please see speech therapy report for full details and  recommendations. Limited upper GI series demonstrated mild gastroesophageal reflux to the level of the midesophagus. There was no evidence of a high-grade focal esophageal stricture.       Fluoroscopy provided for a modified barium swallow, and limited upper GI series. Please see speech therapy report for full details and recommendations. Limited upper GI series demonstrated mild gastroesophageal reflux to the level of the midesophagus.        Xr Chest 1 View    Result Date: 2/2/2021  EXAMINATION: XR CHEST 1 VW- 02/02/2021  INDICATION: Chest Pain triage protocol  COMPARISON: 06/14/2020  FINDINGS: Heart, mediastinum and pulmonary vasculature appear within normal limits. Lungs appear well-expanded and clear except for mild peribronchial thickening and patchy interstitial changes, a little greater than on the prior study, perhaps low-level changes of bronchitis. No lung consolidation, effusion or pneumothorax is seen.      Mildly increased pulmonary interstitial markings bilaterally, perhaps mild bronchitis. No focal pneumonia or other new chest disease is seen.   D:  02/02/2021 E:  02/02/2021  This report was finalized on 2/2/2021 10:34 PM by Dr. Rodri Mcneal MD.      Fl Limited Ugi For Mbs Reflux Single-contrast    Result Date: 2/8/2021  EXAMINATION: FL VIDEO SWALLOW W SPEECH SINGLE-CONTRAST-, FL LIMITED UGI FOR MBS REFLUX SINGLE-CONTRAST-  INDICATION: dysphagia; R07.9-Chest pain, unspecified; R13.10-Dysphagia, unspecified  TECHNIQUE: 40 seconds of fluoroscopic time was used for this exam. 11 associated fluoroscopic loops were saved. The patient was evaluated in the seated lateral position while taking a variety of consistencies of barium by mouth under the direction of speech pathology.  COMPARISON: NONE  FINDINGS: 40 seconds of fluoroscopy provided for a modified barium swallow, and limited upper GI series. Please see speech therapy report for full details and recommendations. Limited upper GI series demonstrated  mild gastroesophageal reflux to the level of the midesophagus. There was no evidence of a high-grade focal esophageal stricture.       Fluoroscopy provided for a modified barium swallow, and limited upper GI series. Please see speech therapy report for full details and recommendations. Limited upper GI series demonstrated mild gastroesophageal reflux to the level of the midesophagus.        Ct Angiogram Chest    Result Date: 2/7/2021  CTA Chest, CTA Abdomen Pelvis INDICATION: Chest tightness. Possible aortic dissection. TECHNIQUE: CT angiogram of the chest, abdomen, and pelvis with and without IV contrast. 3-D reconstructions were obtained and reviewed.   Radiation dose reduction techniques included automated exposure control or exposure modulation based on body size. Count of known CT and cardiac nuc med studies performed in previous 12 months: 0. COMPARISON: None available. FINDINGS: The thoracoabdominal aorta is normal in course and caliber. No dissection is seen. There is some mild atherosclerotic disease in the abdominal aorta. Iliac vasculature in the pelvis is normal. The KELLEN and SMA are widely patent. Bilateral main renal arteries are widely patent. There is a tiny left upper pole accessory renal artery that is also widely patent. Celiac trunk is widely patent. Exam is not tailored for evaluation of the pulmonary arteries, but there is no clear evidence of acute pulmonary embolus. There is no pleural or pericardial effusion. There is no thoracic adenopathy. The esophagus is patulous and fluid-filled, all of which may be due to gastroesophageal reflux. Potentially, scleroderma could result in a patulous esophagus. There is no evidence of an obstructing lesion at the GE junction. The lungs are clear. No CT findings present to indicate pneumonia. Note: CT may be negative in the earliest stages of COVID-19. Gallbladder is contracted. No biliary obstruction is seen. There is a small right renal cyst. Solid  abdominal organs are otherwise normal. The appendix is normal. There are scattered colonic diverticula, but there is no evidence of acute diverticulitis. There is no small bowel obstruction. Urinary bladder is mildly distended, and there is a right posterolateral bladder diverticulum. No free fluid or adenopathy is seen. No fractures are identified in the thoracolumbar spine.     1. No aortic aneurysm or dissection. 2. No evidence for pulmonary embolism within the limits of the study. 3. Negative for pneumonia. 4. Fluid-filled patulous esophagus, possibly simply the result of reflux disease. Consider nonemergent upper GI series for follow-up. 5. Colonic diverticulosis without acute diverticulitis. Otherwise normal GI tract, including the appendix. 6. Additional findings as above. Signer Name: Slick Booker MD  Signed: 2/7/2021 4:02 AM  Workstation Name: [email or web address]  [facility]                Results for orders placed during the hospital encounter of 02/07/21   Adult Stress Echo W/ Cont or Stress Agent if Necessary Per Protocol    Narrative · Average exercise capacity with normal hemodynamic response to exercise.   Expected exercise duration = 8:00 Actual = 7:29 GRIS = ( +5)  · Negative treadmill stress test for anginal chest pain or diagnostic ST   segment changes at high workload and target heart rate. Patient reported   significant dyspnea on exercise.  · Resting echocardiogram findings are as follows:  · Borderline biatrial enlargement.  · Mild right ventricular enlargement.  · Mild left ventricular hypertrophy with normal left ventricular systolic   function, estimated EF 65%.  · Trace mitral regurgitation, trace tricuspid regurgitation.  · Mild pulmonic insufficiency.  · Normal stress echo with no significant echocardiographic evidence for   myocardial ischemia.  · Post-rest ejection fraction 75%.          Plan for Follow-up of Pending Labs/Results:     Discharge Details         Discharge Medications      New Medications      Instructions Start Date   bisoprolol 5 MG tablet  Commonly known as: ZEBeta   5 mg, Oral, Every 24 Hours Scheduled      pantoprazole 40 MG EC tablet  Commonly known as: PROTONIX   40 mg, Oral, Daily         Continue These Medications      Instructions Start Date   Benadryl Allergy 25 mg capsule  Generic drug: diphenhydrAMINE   25 mg, Oral, 3 Times Daily, Recently prescribed. Patient has been taking 12.5 mg once a day since yesterday          Stop These Medications    famotidine 20 MG tablet  Commonly known as: PEPCID     methylPREDNISolone 4 MG dose pack  Commonly known as: MEDROL            Allergies   Allergen Reactions   • Rocephin [Ceftriaxone] Anaphylaxis   • Azithromycin Other (See Comments)     other         Discharge Disposition:  Home or Self Care    Diet:  Hospital:  Diet Order   Procedures   • Diet Regular; Cardiac       Activity:  Activity Instructions     Activity as Tolerated      Measure Blood Pressure            Restrictions or Other Recommendations:         CODE STATUS:    Code Status and Medical Interventions:   Ordered at: 02/07/21 0350     Code Status:    CPR     Medical Interventions (Level of Support Prior to Arrest):    Full       No future appointments.    Additional Instructions for the Follow-ups that You Need to Schedule     Discharge Follow-up with PCP   As directed       Currently Documented PCP:    Provider, No Known    PCP Phone Number:    None     Follow Up Details: follow up with pcp one week                     Miranda Culp, APRN  02/08/21      Time Spent on Discharge:  I spent  35 minutes on this discharge activity which included: face-to-face encounter with the patient, reviewing the data in the system, coordination of the care with the nursing staff as well as consultants, documentation, and entering orders.

## 2021-02-08 NOTE — PROGRESS NOTES
"[facility]  IP Progress Note   LOS: 0 days   Patient Care Team:  Provider, No Known as PCP - General    Chief Complaint: Follow up for Chest Pain    Subjective  Feels better, has ambulated in the room.  No current chest pain or shortness of breath.  States that his discomfort was mostly retrosternal burning and feels that this may have been triggered by recent use of steroids for dental issues.       Active Hospital Problems    Diagnosis   • **Chest pain   • Elevated blood-pressure reading without diagnosis of hypertension   • Abnormal computed tomography of esophagus     Tele: Sinus Rythym    Vitals:  /80   Pulse 67   Temp 98 °F (36.7 °C) (Oral)   Resp 18   Ht 190.5 cm (75\")   Wt 109 kg (241 lb 1.6 oz)   SpO2 92%   BMI 30.14 kg/m²    Body mass index is 30.14 kg/m².    Intake/Output Summary (Last 24 hours) at 2/8/2021 1205  Last data filed at 2/8/2021 0900  Gross per 24 hour   Intake 240 ml   Output 2300 ml   Net -2060 ml       Physical Exam:  General: No apparent distress.  Neck: no JVD.  Chest:No respiratory distress, breath sounds are normal. No wheezes,  rhonchi or rales.  Cardiovascular: Normal S1 and S2, no murmer, gallop or rub.    Extremities: No edema.    Results Review:         Labs:  Results from last 7 days   Lab Units 02/07/21  0233   WBC 10*3/mm3 12.51*   HEMOGLOBIN g/dL 16.2   HEMATOCRIT % 47.7   PLATELETS 10*3/mm3 232     Results from last 7 days   Lab Units 02/07/21  0242  02/02/21  1357   SODIUM mmol/L 137  --  139   POTASSIUM mmol/L 4.2  --  4.0   CHLORIDE mmol/L 98  --  103   CO2 mmol/L 27.0  --  24.0   BUN mg/dL 24*  --  19   CREATININE mg/dL 1.11   < > 0.78   GLUCOSE mg/dL 98  --  120*   CALCIUM mg/dL 10.1  --  9.3   ALT (SGPT) U/L 12  --  11   AST (SGOT) U/L 13  --  11    < > = values in this interval not displayed.     Estimated Creatinine Clearance: 88.5 mL/min (by C-G formula based on SCr of 1.11 mg/dL).  No results found for: " HGBA1C  Results from last 7 days   Lab Units 02/07/21  0934 02/07/21  0423 02/07/21  0242   TROPONIN T ng/mL <0.010 <0.010 <0.010     Results from last 7 days   Lab Units 02/07/21  0242 02/02/21  1357   PROBNP pg/mL 42.4 93.7         COVID19   Date Value Ref Range Status   02/07/2021 Not Detected Not Detected - Ref. Range Final         Scheduled Meds:enoxaparin, 40 mg, Subcutaneous, Q24H  pantoprazole, 40 mg, Oral, Q AM  sodium chloride, 10 mL, Intravenous, Q12H      Assessment/Plan:  1. Chest pain, atypical features, microinfarction ruled out.  Stress echo pending  2. HTN, fair control, continue current management.    Electronically signed by PIA Parks, 02/08/21, 12:07 PM EST.    I have seen and examined the patient, case was discussed with the physician extender, reviewed the above note, necessary changes were made and I agree with the final note.   Brinda Mckeon MD, FACC, [facility]

## 2021-02-08 NOTE — DISCHARGE INSTR - DIET
02/08/2021    MBS/VFSS Reason for Referral  Patient was referred for a MBS to assess the efficiency of his/her swallow function, rule out aspiration and make recommendations regarding safe dietary consistencies, effective compensatory strategies, and safe eating environment.             Recommendations/Treatment  SLP Swallowing Diagnosis: mild, pharyngeal dysphagia, esophageal dysphagia  Functional Impact: no impact on function  Rehab Potential/Prognosis, Swallowing: good, to achieve stated therapy goals  Swallow Criteria for Skilled Therapeutic Interventions Met: no problems identified which require skilled intervention  Therapy Frequency (Swallow): evaluation only  SLP Diet Recommendation: regular textures, thin liquids  Recommended Precautions and Strategies: upright posture during/after eating, general aspiration precautions, reflux precautions  SLP Rec. for Method of Medication Administration: meds whole, as tolerated  Monitor for Signs of Aspiration: yes, notify SLP if any concerns  Anticipated Discharge Disposition (SLP): unknown  Demonstrates Need for Referral to Another Service: gastroenterology, other (see comments)(if continued esophageal concerns )    Instrumental Set-up  Utensils Used: spoon, cup, straw  Consistencies Trialed: thin liquids, pureed, regular textures    Oral Preparation/ Oral Phase  Oral Prep Phase: WFL  Oral Transit Phase: WFL  Oral Residue: WFL             Pharyngeal Phase  Initiation of Pharyngeal Swallow: bolus in pyriform sinuses  Pharyngeal Phase: impaired pharyngeal phase of swallowing  Penetration Before the Swallow: thin liquids  Aspiration During the Swallow: thin liquids, secondary to delayed swallow initiation or mistiming, other (see comments)(w/ initial tsp only )  Response to Aspiration: cough, other (see comments)(Pt coughed throughout entire exam 2' intitial aspiration )  Pharyngeal Residue: all consistencies tested, pyriform sinuses, other (see comments)(2' retrograde  flow )  Response to Residue: unable to clear residue  Attempted Compensatory Maneuvers: bolus size, bolus presentation style  Pharyngeal Phase, Comment: Mild pharyngoesophageal dysphagia. Penetration before the swallow to the level of the true VF, w/ aspiration during 2' mistiming (pt inhaled during swallow). This occured w/ initial tsp sip of thins only. Pt sensed aspiration w/ coughing that continued throughout the exam. No further penetration/aspiration w/ thins via tsp, cup, or straw. No penetration/aspiration w/ pudding or regular consistencies. Mild pyriform sinus residue noted 2' retrograde flow of material (See esophageal phase).     Cervical Esophageal Phase  Esophageal Phase: esophageal retention with retrograde flow through PES, see radiology report for further details, other (see comments)  Esophageal Phase, Comment: Prominent cricopharyngeus muscle noted w/ minimal esophageal retention and retrograde flow through PES (see rad PA report for full limited UGI results). Pt would benefit from strict reflux precautions, education provided on upright posture after eating and general precautions. No futher SLP needs at this time.

## 2021-02-08 NOTE — PROGRESS NOTES
Discharge Planning Assessment  [facility]     Patient Name: Gigi Cooney  MRN: [id number]  Today's Date: 2/8/2021    Admit Date: 2/7/2021    Discharge Needs Assessment     Row Name 02/08/21 1657       Living Environment    Lives With  alone    Current Living Arrangements  home/apartment/condo    Primary Care Provided by  self    Provides Primary Care For  no one    Family Caregiver if Needed  friend(s)    Quality of Family Relationships  helpful;supportive    Able to Return to Prior Arrangements  yes       Resource/Environmental Concerns    Resource/Environmental Concerns  none       Transition Planning    Patient/Family Anticipates Transition to  home    Patient/Family Anticipated Services at Transition  none    Transportation Anticipated  family or friend will provide       Discharge Needs Assessment    Readmission Within the Last 30 Days  no previous admission in last 30 days    Equipment Currently Used at Home  none    Concerns to be Addressed  discharge planning    Current Discharge Risk  lives alone        Discharge Plan     Row Name 02/08/21 1658       Plan    Plan  Home    Patient/Family in Agreement with Plan  yes    Plan Comments  Met with patient in the room this morning to discuss discharge needs. Patient lives alone in [facility] and is independent with ADLs and mobility. He needs a new PCP but declined a referral for a new [facility] provider. He states he prefers to find a provider on his own and will locate a [facility] internal medicine MD on the website. He denies any disccharge needs. A friend will provide his ride. Patient now has DC orders for home.    Final Discharge Disposition Code  01 - home or self-care        Continued Care and Services - Admitted Since 2/7/2021    Coordination has not been started for this encounter.       Expected Discharge Date and Time     Expected Discharge Date Expected Discharge Time    Feb 8, 2021         Demographic Summary     Row Name 02/08/21 1657        General Information    Admission Type  observation    Referral Source  admission list    Reason for Consult  discharge planning    General Information Comments  Needs PCP. Confirmed medical and rx insurance with [facility].        Functional Status     Row Name 02/08/21 1651       Functional Status    Usual Activity Tolerance  good       Functional Status, IADL    Medications  independent    Meal Preparation  independent    Housekeeping  independent    Laundry  independent    Shopping  independent        Psychosocial    No documentation.       Abuse/Neglect    No documentation.       Legal    No documentation.       Substance Abuse    No documentation.       Patient Forms    No documentation.           Aisha Sosa RN

## 2021-02-08 NOTE — PLAN OF CARE
Goal Outcome Evaluation:  Plan of Care Reviewed With: (P) patient      SLP MBS + limited upper GI evaluation completed. Will sign-off. Please see note for further details and recommendations.

## 2021-02-08 NOTE — THERAPY EVALUATION
Acute Care - Speech Language Pathology   Swallow Initial Evaluation  Bar   Modified Barium Swallow Study (MBS)     Patient Name: Gigi Cooney  : 1955  MRN: [id number]  Today's Date: 2021               Admit Date: 2021    Visit Dx:     ICD-10-CM ICD-9-CM   1. Chest pain, unspecified type  R07.9 786.50   2. Pharyngoesophageal dysphagia  R13.14 787.24     Patient Active Problem List   Diagnosis   • Chest pain   • Elevated blood-pressure reading without diagnosis of hypertension   • Abnormal computed tomography of esophagus     Past Medical History:   Diagnosis Date   • Allergies      Past Surgical History:   Procedure Laterality Date   • TONSILLECTOMY          SWALLOW EVALUATION (last 72 hours)      SLP Adult Swallow Evaluation     Row Name 21 1440       Rehab Evaluation    Document Type  evaluation  (Pended)   -MT    Subjective Information  no complaints  (Pended)   -MT    Patient Observations  alert;cooperative;agree to therapy  (Pended)   -MT    Patient/Family/Caregiver Comments/Observations  No family present   (Pended)   -MT    Care Plan Review  evaluation/treatment results reviewed  (Pended)   -MT    Patient Effort  good  (Pended)   -MT       General Information    Patient Profile Reviewed  yes  (Pended)   -MT    Pertinent History Of Current Problem  See previous eval. MBS w/ limited upper GI to R/O pharyngoesophageal dysphagia   (Pended)   -MT    Current Method of Nutrition  regular textures;thin liquids  (Pended)   -MT    Precautions/Limitations, Vision  WFL;for purposes of eval  (Pended)   -MT    Precautions/Limitations, Hearing  WFL;for purposes of eval  (Pended)   -MT    Prior Level of Function-Communication  WFL  (Pended)   -MT    Prior Level of Function-Swallowing  no diet consistency restrictions  (Pended)   -MT    Plans/Goals Discussed with  patient;agreed upon  (Pended)   -MT    Barriers to Rehab  none identified  (Pended)   -MT    Patient's Goals for Discharge   patient did not state  (Pended)   -MT       Pain    Additional Documentation  Pain Scale: FACES Pre/Post-Treatment (Group)  (Pended)   -MT       Pain Scale: FACES Pre/Post-Treatment    Pain: FACES Scale, Pretreatment  0-->no hurt  (Pended)   -MT    Posttreatment Pain Rating  0-->no hurt  (Pended)   -MT       Oral Motor Structure and Function    Dentition Assessment  --    Secretion Management  --    Mucosal Quality  --       Oral Musculature and Cranial Nerve Assessment    Oral Motor General Assessment  --       General Eating/Swallowing Observations    Respiratory Support Currently in Use  --    Eating/Swallowing Skills  --    Positioning During Eating  --    Utensils Used  --    Consistencies Trialed  --       Clinical Swallow Eval    Pharyngeal Phase  --    Esophageal Phase  --    Clinical Swallow Evaluation Summary  --       Pharyngeal Phase Concerns    Pharyngeal Phase Concerns  --    Throat Clear  --       Esophageal Phase Concerns    Esophageal Phase Concerns  --    Sensation of Material Sticking  --       MBS/VFSS    Utensils Used  spoon;cup;straw  (Pended)   -MT    Consistencies Trialed  thin liquids;pureed;regular textures  (Pended)   -MT       MBS/VFSS Interpretation    Oral Prep Phase  WFL  (Pended)   -MT    Oral Transit Phase  WFL  (Pended)   -MT    Oral Residue  WFL  (Pended)   -MT       Initiation of Pharyngeal Swallow    Initiation of Pharyngeal Swallow  bolus in pyriform sinuses  (Pended)   -MT    Pharyngeal Phase  impaired pharyngeal phase of swallowing  (Pended)   -MT    Penetration Before the Swallow  thin liquids  (Pended)   -MT    Aspiration During the Swallow  thin liquids;secondary to delayed swallow initiation or mistiming;other (see comments)  (Pended)  w/ initial tsp only   -MT    Response to Aspiration  cough;other (see comments)  (Pended)  Pt coughed throughout entire exam 2' intitial aspiration   -MT    Rosenbek's Scale  thin:;6--->level 6;pudding/puree:;regular textures:;1--->level 1   (Pended)   -MT    Pharyngeal Residue  all consistencies tested;pyriform sinuses;other (see comments)  (Pended)  2' retrograde flow   -MT    Response to Residue  unable to clear residue  (Pended)   -MT    Attempted Compensatory Maneuvers  bolus size;bolus presentation style  (Pended)   -MT    Pharyngeal Phase, Comment  Mild pharyngoesophageal dysphagia. Penetration before the swallow to the level of the true VF, w/ aspiration during 2' mistiming (pt inhaled during swallow). This occured w/ initial tsp sip of thins only. Pt sensed aspiration w/ coughing that continued throughout the exam. No further penetration/aspiration w/ thins via tsp, cup, or straw. No penetration/aspiration w/ pudding or regular consistencies. Mild pyriform sinus residue noted 2' retrograde flow of material (See esophageal phase).   (Pended)   -MT       Esophageal Phase    Esophageal Phase  esophageal retention with retrograde flow through PES;see radiology report for further details;other (see comments)  (Pended)   -MT    Esophageal Phase, Comment  Prominent cricopharyngeus muscle noted w/ minimal esophageal retention and retrograde flow through PES (see rad PA report for full limited UGI results). Pt would benefit from strict reflux precautions, education provided on upright posture after eating and general precautions. No futher SLP needs at this time.   (Pended)   -MT       Clinical Impression    SLP Swallowing Diagnosis  mild;pharyngeal dysphagia;esophageal dysphagia  (Pended)   -MT    Functional Impact  no impact on function  (Pended)   -MT    Rehab Potential/Prognosis, Swallowing  good, to achieve stated therapy goals  (Pended)   -MT    Swallow Criteria for Skilled Therapeutic Interventions Met  no problems identified which require skilled intervention  (Pended)   -MT       Recommendations    Therapy Frequency (Swallow)  evaluation only  (Pended)   -MT    Predicted Duration Therapy Intervention (Days)  --    SLP Diet Recommendation   regular textures;thin liquids  (Pended)   -MT    Recommended Diagnostics  --    Recommended Precautions and Strategies  upright posture during/after eating;general aspiration precautions;reflux precautions  (Pended)   -MT    Oral Care Recommendations  Oral Care BID/PRN  (Pended)   -MT    SLP Rec. for Method of Medication Administration  meds whole;as tolerated  (Pended)   -MT    Monitor for Signs of Aspiration  yes;notify SLP if any concerns  (Pended)   -MT    Anticipated Discharge Disposition (SLP)  unknown  (Pended)   -MT    Demonstrates Need for Referral to Another Service  gastroenterology;other (see comments)  (Pended)  if continued esophageal concerns   -MT      User Key  (r) = Recorded By, (t) = Taken By, (c) = Cosigned By    Initials Name Effective Dates    MP Jason Bianchi, MS CCC-SLP 06/19/19 -     Carlee Del Cid, Speech Therapy Student 01/14/21 -           EDUCATION  The patient has been educated in the following areas:   Dysphagia (Swallowing Impairment) Oral Care/Hydration.    SLP Recommendation and Plan  SLP Swallowing Diagnosis: (P) mild, pharyngeal dysphagia, esophageal dysphagia  SLP Diet Recommendation: (P) regular textures, thin liquids  Recommended Precautions and Strategies: (P) upright posture during/after eating, general aspiration precautions, reflux precautions  SLP Rec. for Method of Medication Administration: (P) meds whole, as tolerated     Monitor for Signs of Aspiration: (P) yes, notify SLP if any concerns     Swallow Criteria for Skilled Therapeutic Interventions Met: (P) no problems identified which require skilled intervention  Anticipated Discharge Disposition (SLP): (P) unknown  Rehab Potential/Prognosis, Swallowing: (P) good, to achieve stated therapy goals  Therapy Frequency (Swallow): (P) evaluation only     Demonstrates Need for Referral to Another Service: (P) gastroenterology, other (see comments)(if continued esophageal concerns )                      Plan of Care  Reviewed With: (P) patient           Time Calculation:   Time Calculation- SLP     Row Name 02/08/21 1610             Time Calculation- SLP    SLP Start Time  1440  (Pended)   -MT      SLP Received On  02/08/21  (Pended)   -MT        User Key  (r) = Recorded By, (t) = Taken By, (c) = Cosigned By    Initials Name Provider Type    MT Carlee Edwards, Speech Therapy Student Speech Therapy Student          Therapy Charges for Today     Code Description Service Date Service Provider Modifiers Qty    [id number] HC ST MOTION FLUORO EVAL SWALLOW 4 2/8/2021 Carlee Edwards, Speech Therapy Student GN 1            Patient was not wearing a face mask and did exhibit coughing during this therapy encounter.  Procedure performed was aerosolizing, involved close contact (within 6 feet for at least 15 minutes or longer), and did not involve contact with infectious secretions or specimens.  Therapist used appropriate personal protective equipment including gloves, standard procedure mask and eye protection.  Appropriate PPE was worn during the entire therapy session.  Hand hygiene was completed before and after therapy session.  LUISA Paz was also present during this encounter and the aforementioned applies to them, as well.       Carlee Edwards, Speech Therapy Student  2/8/2021

## 2021-09-16 ENCOUNTER — HOSPITAL ENCOUNTER (EMERGENCY)
Facility: HOSPITAL | Age: 66
Discharge: HOME | End: 2021-09-16
Attending: STUDENT IN AN ORGANIZED HEALTH CARE EDUCATION/TRAINING PROGRAM | Admitting: STUDENT IN AN ORGANIZED HEALTH CARE EDUCATION/TRAINING PROGRAM

## 2021-09-16 VITALS
RESPIRATION RATE: 18 BRPM | OXYGEN SATURATION: 98 % | HEART RATE: 80 BPM | TEMPERATURE: 97.9 F | SYSTOLIC BLOOD PRESSURE: 131 MMHG | HEIGHT: 75 IN | WEIGHT: 242 LBS | BODY MASS INDEX: 30.09 KG/M2 | DIASTOLIC BLOOD PRESSURE: 98 MMHG

## 2021-09-16 DIAGNOSIS — T54.91XA INGESTION OF BLEACH, ACCIDENTAL OR UNINTENTIONAL, INITIAL ENCOUNTER: Primary | ICD-10-CM

## 2021-09-16 PROCEDURE — 99282 EMERGENCY DEPT VISIT SF MDM: CPT

## 2021-09-17 NOTE — ED PROVIDER NOTES
Subjective   Patient is a 66-year-old male who presents to the emergency room today following an accidental consumption of bleach.  Patient states that he was coming home from the grocery store and when he came in the front door he saw a bottle of Dasani with clear liquid in it.  He states he reached for the bottle of water and started to drink it and noticed that it was not water.  He shared that he has some family members staying with him who had filled a bottle of water with bleach.  He noticed the liquid was not water and quickly spit it out.  He states that when he realized it was bleach he rinsed his mouth with almond milk and drank some Maalox.  He reports that since the episode he has had some irritation in the back of his throat but denies any other symptoms on exam.          Review of Systems   Constitutional: Negative.    HENT: Positive for sore throat. Negative for trouble swallowing.    Respiratory: Negative for shortness of breath.        Past Medical History:   Diagnosis Date   • Allergies        Allergies   Allergen Reactions   • Rocephin [Ceftriaxone] Anaphylaxis   • Azithromycin Other (See Comments)     other       Past Surgical History:   Procedure Laterality Date   • TONSILLECTOMY         No family history on file.    Social History     Socioeconomic History   • Marital status: Single     Spouse name: Not on file   • Number of children: Not on file   • Years of education: Not on file   • Highest education level: Not on file   Tobacco Use   • Smoking status: Never Smoker   • Smokeless tobacco: Never Used   Substance and Sexual Activity   • Alcohol use: Not Currently   • Drug use: Never   • Sexual activity: Defer           Objective   Physical Exam  Vitals and nursing note reviewed.   Constitutional:       General: He is not in acute distress.     Appearance: Normal appearance. He is not ill-appearing or toxic-appearing.   HENT:      Head: Normocephalic and atraumatic.      Nose: Nose normal.       Mouth/Throat:      Mouth: Mucous membranes are moist.      Pharynx: Oropharynx is clear. No posterior oropharyngeal erythema.   Eyes:      Extraocular Movements: Extraocular movements intact.      Conjunctiva/sclera: Conjunctivae normal.   Cardiovascular:      Rate and Rhythm: Normal rate and regular rhythm.   Pulmonary:      Effort: Pulmonary effort is normal. No respiratory distress.      Breath sounds: Normal breath sounds.   Musculoskeletal:         General: Normal range of motion.      Cervical back: Normal range of motion.   Skin:     General: Skin is warm and dry.   Neurological:      General: No focal deficit present.      Mental Status: He is alert.   Psychiatric:         Mood and Affect: Mood normal.         Behavior: Behavior normal.         Thought Content: Thought content normal.         Judgment: Judgment normal.         Procedures           ED Course  ED Course as of Sep 20 1338   Thu Sep 16, 2021   2118 I spoke with Christina at poison control who shared that bleach was not a huge concern and that likely was just a mild irritant.  She recommended a oral challenge with liquids to make sure patient was swallowing okay.  Stated patient could be discharged home and if symptoms persisted can follow-up with his primary care physician.    [JG]   Mon Sep 20, 2021   1336 Patient presents to ED with complaints of a sore throat after accidental ingestion of a small amount of bleach. No acute or emergent findings on physical exam. Poison control contacted with recommendations implemented as noted in chart.  Patient is afebrile, nontoxic appearing, vital signs stable and able to maintain O2 sats of 98% on room air. Patient will be discharged home with outpatient follow up to their primary care provider as recommended. Patient is agreeable to plan of care of outpatient follow up, provided clear return precautions and demonstrated understanding.    [JG]      ED Course User Index  [JG] Ron Tinajero, PA      No  "results found for this or any previous visit (from the past 24 hour(s)).  Note: In addition to lab results from this visit, the labs listed above may include labs taken at another facility or during a different encounter within the last 24 hours. Please correlate lab times with ED admission and discharge times for further clarification of the services performed during this visit.    No orders to display     Vitals:    09/16/21 2030   BP: 131/98   BP Location: Right arm   Patient Position: Sitting   Pulse: 80   Resp: 18   Temp: 97.9 °F (36.6 °C)   TempSrc: Oral   SpO2: 98%   Weight: 110 kg (242 lb)   Height: 190.5 cm (75\")     Medications - No data to display  ECG/EMG Results (last 24 hours)     ** No results found for the last 24 hours. **        No orders to display                                          MDM  Number of Diagnoses or Management Options  Ingestion of bleach, accidental or unintentional, initial encounter: new and requires workup  Risk of Complications, Morbidity, and/or Mortality  Presenting problems: low  Diagnostic procedures: low  Management options: low    Patient Progress  Patient progress: improved      Final diagnoses:   Ingestion of bleach, accidental or unintentional, initial encounter       ED Disposition  ED Disposition     ED Disposition Condition Comment    Discharge Stable           PATIENT CONNECTION - [facility] [address]  [phone]  Call   As needed    [facility] Emergency Department  [address] [place] [address] [phone]  Go to   If symptoms worsen         Medication List      No changes were made to your prescriptions during this visit.          Ron Tinajero PA  09/20/21 1338    "

## 2024-05-06 ENCOUNTER — LAB (OUTPATIENT)
Dept: LAB | Facility: HOSPITAL | Age: 69
End: 2024-05-06
Payer: MEDICARE

## 2024-05-06 ENCOUNTER — TELEPHONE (OUTPATIENT)
Dept: CARDIOLOGY | Facility: CLINIC | Age: 69
End: 2024-05-06
Payer: MEDICARE

## 2024-05-06 DIAGNOSIS — E55.9 VITAMIN D DEFICIENCY: ICD-10-CM

## 2024-05-06 DIAGNOSIS — E78.5 HYPERLIPIDEMIA, UNSPECIFIED HYPERLIPIDEMIA TYPE: ICD-10-CM

## 2024-05-06 DIAGNOSIS — L03.90 CELLULITIS, UNSPECIFIED CELLULITIS SITE: Primary | ICD-10-CM

## 2024-05-06 DIAGNOSIS — L03.90 CELLULITIS, UNSPECIFIED CELLULITIS SITE: ICD-10-CM

## 2024-05-06 LAB
BASOPHILS # BLD AUTO: 0.05 10*3/MM3 (ref 0–0.2)
BASOPHILS NFR BLD AUTO: 1 % (ref 0–1.5)
BILIRUB UR QL STRIP: NEGATIVE
CLARITY UR: CLEAR
COLOR UR: YELLOW
DEPRECATED RDW RBC AUTO: 45.2 FL (ref 37–54)
EOSINOPHIL # BLD AUTO: 0.16 10*3/MM3 (ref 0–0.4)
EOSINOPHIL NFR BLD AUTO: 3.1 % (ref 0.3–6.2)
ERYTHROCYTE [DISTWIDTH] IN BLOOD BY AUTOMATED COUNT: 13.4 % (ref 12.3–15.4)
GLUCOSE UR STRIP-MCNC: NEGATIVE MG/DL
HCT VFR BLD AUTO: 43.7 % (ref 37.5–51)
HGB BLD-MCNC: 14.5 G/DL (ref 13–17.7)
HGB UR QL STRIP.AUTO: NEGATIVE
IMM GRANULOCYTES # BLD AUTO: 0.01 10*3/MM3 (ref 0–0.05)
IMM GRANULOCYTES NFR BLD AUTO: 0.2 % (ref 0–0.5)
KETONES UR QL STRIP: NEGATIVE
LEUKOCYTE ESTERASE UR QL STRIP.AUTO: NEGATIVE
LYMPHOCYTES # BLD AUTO: 1.52 10*3/MM3 (ref 0.7–3.1)
LYMPHOCYTES NFR BLD AUTO: 29.6 % (ref 19.6–45.3)
MCH RBC QN AUTO: 30.6 PG (ref 26.6–33)
MCHC RBC AUTO-ENTMCNC: 33.2 G/DL (ref 31.5–35.7)
MCV RBC AUTO: 92.2 FL (ref 79–97)
MONOCYTES # BLD AUTO: 0.56 10*3/MM3 (ref 0.1–0.9)
MONOCYTES NFR BLD AUTO: 10.9 % (ref 5–12)
NEUTROPHILS NFR BLD AUTO: 2.83 10*3/MM3 (ref 1.7–7)
NEUTROPHILS NFR BLD AUTO: 55.2 % (ref 42.7–76)
NITRITE UR QL STRIP: NEGATIVE
NRBC BLD AUTO-RTO: 0 /100 WBC (ref 0–0.2)
PH UR STRIP.AUTO: 6.5 [PH] (ref 5–8)
PLATELET # BLD AUTO: 210 10*3/MM3 (ref 140–450)
PMV BLD AUTO: 9.7 FL (ref 6–12)
PROT UR QL STRIP: NEGATIVE
RBC # BLD AUTO: 4.74 10*6/MM3 (ref 4.14–5.8)
SP GR UR STRIP: 1.01 (ref 1–1.03)
UROBILINOGEN UR QL STRIP: NORMAL
WBC NRBC COR # BLD AUTO: 5.13 10*3/MM3 (ref 3.4–10.8)

## 2024-05-06 PROCEDURE — 85025 COMPLETE CBC W/AUTO DIFF WBC: CPT

## 2024-05-06 PROCEDURE — 87040 BLOOD CULTURE FOR BACTERIA: CPT

## 2024-05-06 PROCEDURE — 80053 COMPREHEN METABOLIC PANEL: CPT

## 2024-05-06 PROCEDURE — 81003 URINALYSIS AUTO W/O SCOPE: CPT

## 2024-05-06 PROCEDURE — 36415 COLL VENOUS BLD VENIPUNCTURE: CPT

## 2024-05-06 PROCEDURE — 84402 ASSAY OF FREE TESTOSTERONE: CPT

## 2024-05-06 PROCEDURE — 84403 ASSAY OF TOTAL TESTOSTERONE: CPT

## 2024-05-06 PROCEDURE — 86141 C-REACTIVE PROTEIN HS: CPT

## 2024-05-06 PROCEDURE — 85652 RBC SED RATE AUTOMATED: CPT

## 2024-05-06 PROCEDURE — 84443 ASSAY THYROID STIM HORMONE: CPT

## 2024-05-06 PROCEDURE — 82306 VITAMIN D 25 HYDROXY: CPT

## 2024-05-07 LAB
25(OH)D3 SERPL-MCNC: 60.8 NG/ML (ref 30–100)
ALBUMIN SERPL-MCNC: 4.7 G/DL (ref 3.5–5.2)
ALBUMIN/GLOB SERPL: 1.8 G/DL
ALP SERPL-CCNC: 64 U/L (ref 39–117)
ALT SERPL W P-5'-P-CCNC: 24 U/L (ref 1–41)
ANION GAP SERPL CALCULATED.3IONS-SCNC: 14 MMOL/L (ref 5–15)
AST SERPL-CCNC: 22 U/L (ref 1–40)
BILIRUB SERPL-MCNC: 0.8 MG/DL (ref 0–1.2)
BUN SERPL-MCNC: 14 MG/DL (ref 8–23)
BUN/CREAT SERPL: 17.1 (ref 7–25)
CALCIUM SPEC-SCNC: 9.7 MG/DL (ref 8.6–10.5)
CHLORIDE SERPL-SCNC: 100 MMOL/L (ref 98–107)
CO2 SERPL-SCNC: 23 MMOL/L (ref 22–29)
CREAT SERPL-MCNC: 0.82 MG/DL (ref 0.76–1.27)
CRP SERPL-MCNC: 0.44 MG/DL (ref 0.01–0.5)
EGFRCR SERPLBLD CKD-EPI 2021: 95.7 ML/MIN/1.73
ERYTHROCYTE [SEDIMENTATION RATE] IN BLOOD: 11 MM/HR (ref 0–20)
GLOBULIN UR ELPH-MCNC: 2.6 GM/DL
GLUCOSE SERPL-MCNC: 85 MG/DL (ref 65–99)
POTASSIUM SERPL-SCNC: 4.3 MMOL/L (ref 3.5–5.2)
PROT SERPL-MCNC: 7.3 G/DL (ref 6–8.5)
SODIUM SERPL-SCNC: 137 MMOL/L (ref 136–145)
TSH SERPL DL<=0.05 MIU/L-ACNC: 1.29 UIU/ML (ref 0.27–4.2)

## 2024-05-10 ENCOUNTER — TELEPHONE (OUTPATIENT)
Dept: CARDIOLOGY | Facility: CLINIC | Age: 69
End: 2024-05-10
Payer: MEDICARE

## 2024-05-10 ENCOUNTER — DOCUMENTATION (OUTPATIENT)
Dept: CARDIOLOGY | Facility: CLINIC | Age: 69
End: 2024-05-10
Payer: MEDICARE

## 2024-05-10 DIAGNOSIS — S41.109D: Primary | ICD-10-CM

## 2024-05-11 LAB — BACTERIA SPEC AEROBE CULT: NORMAL

## 2024-05-11 PROCEDURE — 87077 CULTURE AEROBIC IDENTIFY: CPT | Performed by: FAMILY MEDICINE

## 2024-05-11 PROCEDURE — 87186 SC STD MICRODIL/AGAR DIL: CPT | Performed by: FAMILY MEDICINE

## 2024-05-11 PROCEDURE — 87070 CULTURE OTHR SPECIMN AEROBIC: CPT | Performed by: FAMILY MEDICINE

## 2024-05-11 PROCEDURE — 87205 SMEAR GRAM STAIN: CPT | Performed by: FAMILY MEDICINE

## 2024-05-12 LAB
TESTOST FREE SERPL-MCNC: 2.9 PG/ML (ref 6.6–18.1)
TESTOST SERPL-MCNC: 371.4 NG/DL (ref 264–916)

## 2024-08-08 ENCOUNTER — TELEPHONE (OUTPATIENT)
Dept: CARDIOLOGY | Facility: CLINIC | Age: 69
End: 2024-08-08
Payer: MEDICARE

## 2024-08-08 ENCOUNTER — LAB (OUTPATIENT)
Dept: LAB | Facility: HOSPITAL | Age: 69
End: 2024-08-08
Payer: MEDICARE

## 2024-08-08 DIAGNOSIS — R06.09 DYSPNEA ON EXERTION: ICD-10-CM

## 2024-08-08 DIAGNOSIS — I70.0 ATHEROSCLEROSIS OF AORTA: ICD-10-CM

## 2024-08-08 DIAGNOSIS — K29.60 REFLUX GASTRITIS: ICD-10-CM

## 2024-08-08 DIAGNOSIS — K29.60 REFLUX GASTRITIS: Primary | ICD-10-CM

## 2024-08-08 LAB — D DIMER PPP FEU-MCNC: 0.44 MCGFEU/ML (ref 0–0.69)

## 2024-08-08 PROCEDURE — 82550 ASSAY OF CK (CPK): CPT

## 2024-08-08 PROCEDURE — 85379 FIBRIN DEGRADATION QUANT: CPT

## 2024-08-08 PROCEDURE — 80053 COMPREHEN METABOLIC PANEL: CPT

## 2024-08-08 PROCEDURE — 86038 ANTINUCLEAR ANTIBODIES: CPT

## 2024-08-08 PROCEDURE — 86141 C-REACTIVE PROTEIN HS: CPT

## 2024-08-08 PROCEDURE — 85652 RBC SED RATE AUTOMATED: CPT

## 2024-08-08 PROCEDURE — 83013 H PYLORI (C-13) BREATH: CPT

## 2024-08-08 PROCEDURE — 86225 DNA ANTIBODY NATIVE: CPT

## 2024-08-08 PROCEDURE — 36415 COLL VENOUS BLD VENIPUNCTURE: CPT

## 2024-08-08 PROCEDURE — 85025 COMPLETE CBC W/AUTO DIFF WBC: CPT

## 2024-08-08 RX ORDER — PANTOPRAZOLE SODIUM 40 MG/1
TABLET, DELAYED RELEASE ORAL
Qty: 90 TABLET | Refills: 3 | Status: SHIPPED | OUTPATIENT
Start: 2024-08-08

## 2024-08-08 NOTE — TELEPHONE ENCOUNTER
Having worsening GERD.  Getting some blood test to evaluate further.  Also some concern regarding blood clot hence d dimer.

## 2024-08-09 LAB
ALBUMIN SERPL-MCNC: 4.5 G/DL (ref 3.5–5.2)
ALBUMIN/GLOB SERPL: 1.7 G/DL
ALP SERPL-CCNC: 64 U/L (ref 39–117)
ALT SERPL W P-5'-P-CCNC: 37 U/L (ref 1–41)
ANION GAP SERPL CALCULATED.3IONS-SCNC: 8.3 MMOL/L (ref 5–15)
AST SERPL-CCNC: 30 U/L (ref 1–40)
BASOPHILS # BLD AUTO: 0.08 10*3/MM3 (ref 0–0.2)
BASOPHILS NFR BLD AUTO: 1.6 % (ref 0–1.5)
BILIRUB SERPL-MCNC: 0.7 MG/DL (ref 0–1.2)
BUN SERPL-MCNC: 9 MG/DL (ref 8–23)
BUN/CREAT SERPL: 8.9 (ref 7–25)
CALCIUM SPEC-SCNC: 9.7 MG/DL (ref 8.6–10.5)
CHLORIDE SERPL-SCNC: 106 MMOL/L (ref 98–107)
CK SERPL-CCNC: 67 U/L (ref 20–200)
CO2 SERPL-SCNC: 25.7 MMOL/L (ref 22–29)
CREAT SERPL-MCNC: 1.01 MG/DL (ref 0.76–1.27)
CRP SERPL-MCNC: 0.31 MG/DL (ref 0.01–0.5)
DEPRECATED RDW RBC AUTO: 44.7 FL (ref 37–54)
EGFRCR SERPLBLD CKD-EPI 2021: 80.5 ML/MIN/1.73
EOSINOPHIL # BLD AUTO: 0.13 10*3/MM3 (ref 0–0.4)
EOSINOPHIL NFR BLD AUTO: 2.6 % (ref 0.3–6.2)
ERYTHROCYTE [DISTWIDTH] IN BLOOD BY AUTOMATED COUNT: 13.2 % (ref 12.3–15.4)
ERYTHROCYTE [SEDIMENTATION RATE] IN BLOOD: 12 MM/HR (ref 0–20)
GLOBULIN UR ELPH-MCNC: 2.6 GM/DL
GLUCOSE SERPL-MCNC: 94 MG/DL (ref 65–99)
HCT VFR BLD AUTO: 44 % (ref 37.5–51)
HGB BLD-MCNC: 15 G/DL (ref 13–17.7)
IMM GRANULOCYTES # BLD AUTO: 0.02 10*3/MM3 (ref 0–0.05)
IMM GRANULOCYTES NFR BLD AUTO: 0.4 % (ref 0–0.5)
LYMPHOCYTES # BLD AUTO: 1.7 10*3/MM3 (ref 0.7–3.1)
LYMPHOCYTES NFR BLD AUTO: 34.4 % (ref 19.6–45.3)
MCH RBC QN AUTO: 31.3 PG (ref 26.6–33)
MCHC RBC AUTO-ENTMCNC: 34.1 G/DL (ref 31.5–35.7)
MCV RBC AUTO: 91.9 FL (ref 79–97)
MONOCYTES # BLD AUTO: 0.57 10*3/MM3 (ref 0.1–0.9)
MONOCYTES NFR BLD AUTO: 11.5 % (ref 5–12)
NEUTROPHILS NFR BLD AUTO: 2.44 10*3/MM3 (ref 1.7–7)
NEUTROPHILS NFR BLD AUTO: 49.5 % (ref 42.7–76)
PLATELET # BLD AUTO: 223 10*3/MM3 (ref 140–450)
PMV BLD AUTO: 9.9 FL (ref 6–12)
POTASSIUM SERPL-SCNC: 4.6 MMOL/L (ref 3.5–5.2)
PROT SERPL-MCNC: 7.1 G/DL (ref 6–8.5)
RBC # BLD AUTO: 4.79 10*6/MM3 (ref 4.14–5.8)
SODIUM SERPL-SCNC: 140 MMOL/L (ref 136–145)
WBC NRBC COR # BLD AUTO: 4.94 10*3/MM3 (ref 3.4–10.8)

## 2024-08-11 LAB — UREA BREATH TEST QL: NEGATIVE

## 2024-08-12 ENCOUNTER — TELEPHONE (OUTPATIENT)
Dept: CARDIOLOGY | Facility: CLINIC | Age: 69
End: 2024-08-12
Payer: MEDICARE

## 2024-08-12 DIAGNOSIS — K29.60 REFLUX GASTRITIS: Primary | ICD-10-CM

## 2024-08-12 LAB
ANA SER QL: POSITIVE
DSDNA AB SER-ACNC: 10 IU/ML (ref 0–9)
Lab: ABNORMAL

## 2024-08-16 ENCOUNTER — LAB (OUTPATIENT)
Dept: LAB | Facility: HOSPITAL | Age: 69
End: 2024-08-16
Payer: MEDICARE

## 2024-08-16 DIAGNOSIS — K29.60 REFLUX GASTRITIS: ICD-10-CM

## 2024-08-16 PROCEDURE — 83013 H PYLORI (C-13) BREATH: CPT

## 2024-08-19 LAB — UREA BREATH TEST QL: NEGATIVE

## 2024-10-23 ENCOUNTER — APPOINTMENT (OUTPATIENT)
Facility: HOSPITAL | Age: 69
End: 2024-10-23
Payer: MEDICARE

## 2024-10-23 ENCOUNTER — HOSPITAL ENCOUNTER (EMERGENCY)
Facility: HOSPITAL | Age: 69
Discharge: HOME | End: 2024-10-23
Attending: STUDENT IN AN ORGANIZED HEALTH CARE EDUCATION/TRAINING PROGRAM | Admitting: EMERGENCY MEDICINE
Payer: MEDICARE

## 2024-10-23 VITALS
SYSTOLIC BLOOD PRESSURE: 176 MMHG | RESPIRATION RATE: 20 BRPM | DIASTOLIC BLOOD PRESSURE: 77 MMHG | TEMPERATURE: 98 F | HEART RATE: 62 BPM | BODY MASS INDEX: 33.57 KG/M2 | OXYGEN SATURATION: 97 % | HEIGHT: 75 IN | WEIGHT: 270 LBS

## 2024-10-23 DIAGNOSIS — M67.971 ACHILLES TENDON DISORDER, RIGHT: Primary | ICD-10-CM

## 2024-10-23 PROCEDURE — 73630 X-RAY EXAM OF FOOT: CPT

## 2024-10-23 PROCEDURE — 99283 EMERGENCY DEPT VISIT LOW MDM: CPT

## 2024-10-23 RX ORDER — IBUPROFEN 200 MG
600 TABLET ORAL ONCE
Status: COMPLETED | OUTPATIENT
Start: 2024-10-23 | End: 2024-10-23

## 2024-10-23 RX ADMIN — IBUPROFEN 600 MG: 200 TABLET, FILM COATED ORAL at 19:24

## 2024-10-23 NOTE — FS[facility] PROVIDER NOTE
"Subjective  History of Present Illness:    Patient is a 69-year-old male who presents with pain in the heel, calf after he was walking in the kitchen on his socks and stepped awkwardly and immediately felt pain in the back of his calf.  Patient states that he has not been able to put much weight on it due to pain.  Patient states he is able to move it around but moving it towards him is very painful.  Patient denies any numbness/tingling.  Patient denies any other injury/pain.      Nurses Notes reviewed and agree, including vitals, allergies, social history and prior medical history.     REVIEW OF SYSTEMS: All systems reviewed and not pertinent unless noted.  Review of Systems    Past Medical History:   Diagnosis Date    Allergies        Allergies:    Rocephin [ceftriaxone] and Azithromycin      Past Surgical History:   Procedure Laterality Date    DENTAL PROCEDURE      TONSILLECTOMY           Social History     Socioeconomic History    Marital status: Single   Tobacco Use    Smoking status: Never     Passive exposure: Never    Smokeless tobacco: Never   Vaping Use    Vaping status: Never Used   Substance and Sexual Activity    Alcohol use: Not Currently    Drug use: Never    Sexual activity: Defer         No family history on file.    Objective  Physical Exam:  /77   Pulse 62   Temp 98 °F (36.7 °C) (Oral)   Resp 20   Ht 190.5 cm (75\")   Wt 122 kg (270 lb)   SpO2 97%   BMI 33.75 kg/m²      Physical Exam  Constitutional:       Appearance: Well-developed. No acute distress  HENT:      Head: Normocephalic and atraumatic.      Mouth/Throat:      Mouth: Mucous membranes are moist.      Eyes:      Extraocular Movements: Extraocular movements intact.   Cardiovascular:      Rate and Rhythm: Normal rate and regular rhythm.      Heart sounds: Normal heart sounds.   Pulmonary:      Effort: Pulmonary effort is normal. No respiratory distress.   Musculoskeletal:         General: Pain with dorsiflexion of the right " ankle.  Mild pain with plantarflexion.  Mild pain to palpation of distal Achilles insertion site, distally intact.  No edema or discoloration noted of calf.  No pain to palpation of malleoli.  2+ pulses right PT, DP and popliteal.      Extremities: Moves all 4s   Skin:     General: Skin is warm and dry.      Capillary Refill: Capillary refill takes less than 2 seconds.   Neurological:      Mental Status:Alert and oriented to person, place, and time.   Mentation is normal   Psychiatric:         Mood and Affect: Mood normal.         Behavior: Behavior normal.     Procedures    ED Course:    ED Course as of 10/23/24 2000   Wed Oct 23, 2024   1934 X-ray does not show any acute abnormalities. [OM]      ED Course User Index  [OM] Tiffani Conde PA-C       Lab Results (last 24 hours)       ** No results found for the last 24 hours. **             XR Foot 3+ View Right    Result Date: 10/23/2024  XR FOOT 3+ VW RIGHT Date of Exam: 10/23/2024 6:35 PM EDT Indication: pain trauma Comparison: None available. Findings: No acute fracture or malalignment.  No focal soft tissue abnormalities appreciated. Joint spaces are grossly preserved without significant degenerative change.     Impression: Impression: No acute osseous abnormality. Electronically Signed: Janes Gabriel  10/23/2024 7:29 PM EDT  Workstation ID: [id number]        MDM      Initial impression of presenting illness: Patient states he was in his kitchen and stepped awkwardly and immediately felt pain going up the back of his calf.  No numbness/tingling.  No overlying edema or discoloration of the calf.  Right lower extremity neurovascularly intact.    DDX: includes but is not limited to: Achilles tendon rupture, Achilles tendon partial tear, Achilles tendinitis, stress fracture, sprain, strain    Patient arrives comfortable, stable with vitals interpreted by myself.     Pertinent results: X-ray does not show any acute abnormalities      Interventions / Re-evaluation:  Offered patient Toradol and muscle relaxer but states he would just like ibuprofen.  After reevaluation he said the ibuprofen has helped.  Medications   ibuprofen (ADVIL,MOTRIN) tablet 600 mg (600 mg Oral Given 10/23/24 1924)       Results/clinical rationale were discussed with patient and person in the room with him    Data interpreted: Nursing notes reviewed, vital signs reviewed.  Labs independently interpreted by me     Counseling: Discussed the results above with the patient regarding need for admission or discharge.  Patient understands and agrees plan of care. Discussed with patients the results from today's visit. Discussed with patient strict return precautions and they verbalize understanding. Recommend to them following up with primary care as soon as possible. Patient is discharged hemodynamically stable and comfortable.     Discussed patient to follow-up with orthopedics on his paperwork along with his primary care.  Patient states that he can follow-up very closely with his primary care.  Discussed with patient we will give him a boot and crutches for pain and stability.  Discussed patient likely will need an MRI to evaluate the Achilles tendon further along with surrounding musculature and he is agreeable with this plan.    -----  ED Disposition       ED Disposition   Discharge    Condition   Stable    Comment   --             Final diagnoses:   Achilles tendon disorder, right      Your Follow-Up Providers       Schedule an appointment as soon as possible for a visit  with [facility].    [address] [address] [phone]                     Contact information for after-discharge care    Follow-up information has not been specified.                    Your medication list        CONTINUE taking these medications        Instructions Last Dose Given Next Dose Due   Benadryl Allergy 25 mg capsule  Generic drug: diphenhydrAMINE      Take 25 mg by mouth  3 (Three) Times a Day. Recently prescribed. Patient has been taking 12.5 mg once a day since yesterday       bisoprolol 5 MG tablet  Commonly known as: ZEBeta      Take 1 tablet by mouth Daily.       pantoprazole 40 MG EC tablet  Commonly known as: PROTONIX      Take twice daily for two weeks followed by once daily (for refills once daily)       sulfamethoxazole-trimethoprim 800-160 MG per tablet  Commonly known as: BACTRIM DS,SEPTRA DS      Take 1 tablet by mouth 2 (Two) Times a Day.

## 2024-10-23 NOTE — DISCHARGE INSTRUCTIONS
Please return with worsening or changing symptoms.  Please use the boot and crutches for walking to help with pain and stability.  Also recommend following up to have an outpatient MRI for further evaluation.